# Patient Record
Sex: FEMALE | Race: WHITE | Employment: OTHER | URBAN - METROPOLITAN AREA
[De-identification: names, ages, dates, MRNs, and addresses within clinical notes are randomized per-mention and may not be internally consistent; named-entity substitution may affect disease eponyms.]

---

## 2018-06-28 PROCEDURE — 88312 SPECIAL STAINS GROUP 1: CPT | Performed by: INTERNAL MEDICINE

## 2018-06-28 PROCEDURE — 88305 TISSUE EXAM BY PATHOLOGIST: CPT | Performed by: INTERNAL MEDICINE

## 2018-06-29 ENCOUNTER — HOSPITAL ENCOUNTER (OUTPATIENT)
Dept: LAB | Age: 58
Discharge: HOME | End: 2018-06-29

## 2019-07-11 PROBLEM — E66.01 SEVERE OBESITY (HCC): Status: ACTIVE | Noted: 2019-07-11

## 2019-10-15 ENCOUNTER — HOSPITAL ENCOUNTER (OUTPATIENT)
Age: 59
Setting detail: OBSERVATION
Discharge: HOME | End: 2019-10-18
Attending: UROLOGY | Admitting: UROLOGY
Payer: MEDICARE

## 2019-10-15 PROBLEM — N39.0 UTI (URINARY TRACT INFECTION): Status: ACTIVE | Noted: 2019-10-15

## 2019-10-15 LAB
ANION GAP SERPL CALC-SCNC: 12 MMOL/L (ref 7–16)
BUN SERPL-MCNC: 17 MG/DL (ref 8–23)
CALCIUM SERPL-MCNC: 8.4 MG/DL (ref 8.3–10.4)
CHLORIDE SERPL-SCNC: 108 MMOL/L (ref 98–107)
CO2 SERPL-SCNC: 21 MMOL/L (ref 21–32)
CREAT SERPL-MCNC: 1.37 MG/DL (ref 0.6–1)
ERYTHROCYTE [DISTWIDTH] IN BLOOD BY AUTOMATED COUNT: 13.3 % (ref 11.9–14.6)
GLUCOSE SERPL-MCNC: 156 MG/DL (ref 65–100)
HCT VFR BLD AUTO: 41.4 % (ref 35.8–46.3)
HGB BLD-MCNC: 13.5 G/DL (ref 11.7–15.4)
MCH RBC QN AUTO: 30.4 PG (ref 26.1–32.9)
MCHC RBC AUTO-ENTMCNC: 32.6 G/DL (ref 31.4–35)
MCV RBC AUTO: 93.2 FL (ref 79.6–97.8)
NRBC # BLD: 0 K/UL (ref 0–0.2)
PLATELET # BLD AUTO: 205 K/UL (ref 150–450)
PMV BLD AUTO: 10.3 FL (ref 9.4–12.3)
POTASSIUM SERPL-SCNC: 4.1 MMOL/L (ref 3.5–5.1)
RBC # BLD AUTO: 4.44 M/UL (ref 4.05–5.2)
SODIUM SERPL-SCNC: 141 MMOL/L (ref 136–145)
WBC # BLD AUTO: 9.5 K/UL (ref 4.3–11.1)

## 2019-10-15 PROCEDURE — 36415 COLL VENOUS BLD VENIPUNCTURE: CPT

## 2019-10-15 PROCEDURE — 85027 COMPLETE CBC AUTOMATED: CPT

## 2019-10-15 PROCEDURE — 74011250636 HC RX REV CODE- 250/636: Performed by: UROLOGY

## 2019-10-15 PROCEDURE — 77030005546 HC CATH URETH FOL 3W BARD -A

## 2019-10-15 PROCEDURE — 87086 URINE CULTURE/COLONY COUNT: CPT

## 2019-10-15 PROCEDURE — 99218 HC RM OBSERVATION: CPT

## 2019-10-15 PROCEDURE — 74011250637 HC RX REV CODE- 250/637: Performed by: UROLOGY

## 2019-10-15 PROCEDURE — 80048 BASIC METABOLIC PNL TOTAL CA: CPT

## 2019-10-15 PROCEDURE — 74011000272 HC RX REV CODE- 272: Performed by: UROLOGY

## 2019-10-15 RX ORDER — TRAZODONE HYDROCHLORIDE 50 MG/1
100 TABLET ORAL
Status: DISCONTINUED | OUTPATIENT
Start: 2019-10-15 | End: 2019-10-18 | Stop reason: HOSPADM

## 2019-10-15 RX ORDER — PANTOPRAZOLE SODIUM 40 MG/1
40 TABLET, DELAYED RELEASE ORAL DAILY
COMMUNITY

## 2019-10-15 RX ORDER — BACLOFEN 10 MG/1
10 TABLET ORAL 3 TIMES DAILY
Status: DISCONTINUED | OUTPATIENT
Start: 2019-10-15 | End: 2019-10-18 | Stop reason: HOSPADM

## 2019-10-15 RX ORDER — GLIPIZIDE 2.5 MG/1
5 TABLET, EXTENDED RELEASE ORAL DAILY
Status: DISCONTINUED | OUTPATIENT
Start: 2019-10-16 | End: 2019-10-18 | Stop reason: HOSPADM

## 2019-10-15 RX ORDER — BACLOFEN 10 MG/1
10 TABLET ORAL
COMMUNITY

## 2019-10-15 RX ORDER — AMLODIPINE BESYLATE 5 MG/1
5 TABLET ORAL DAILY
Status: ON HOLD | COMMUNITY
End: 2019-10-15 | Stop reason: CLARIF

## 2019-10-15 RX ORDER — HYDROCODONE BITARTRATE AND ACETAMINOPHEN 10; 325 MG/1; MG/1
1 TABLET ORAL
COMMUNITY

## 2019-10-15 RX ORDER — ASPIRIN 81 MG/1
81 TABLET ORAL DAILY
Status: DISCONTINUED | OUTPATIENT
Start: 2019-10-16 | End: 2019-10-18 | Stop reason: HOSPADM

## 2019-10-15 RX ORDER — PANTOPRAZOLE SODIUM 40 MG/1
40 TABLET, DELAYED RELEASE ORAL
Status: DISCONTINUED | OUTPATIENT
Start: 2019-10-16 | End: 2019-10-18 | Stop reason: HOSPADM

## 2019-10-15 RX ORDER — ASPIRIN 81 MG/1
TABLET ORAL DAILY
COMMUNITY

## 2019-10-15 RX ORDER — AMLODIPINE BESYLATE 10 MG/1
10 TABLET ORAL DAILY
COMMUNITY

## 2019-10-15 RX ORDER — AMLODIPINE BESYLATE 10 MG/1
10 TABLET ORAL DAILY
Status: DISCONTINUED | OUTPATIENT
Start: 2019-10-16 | End: 2019-10-18 | Stop reason: HOSPADM

## 2019-10-15 RX ORDER — LITHIUM CARBONATE 300 MG/1
300 CAPSULE ORAL DAILY
Status: DISCONTINUED | OUTPATIENT
Start: 2019-10-16 | End: 2019-10-18 | Stop reason: HOSPADM

## 2019-10-15 RX ORDER — HYDROCODONE BITARTRATE AND ACETAMINOPHEN 10; 325 MG/1; MG/1
1 TABLET ORAL
Status: DISCONTINUED | OUTPATIENT
Start: 2019-10-15 | End: 2019-10-18 | Stop reason: HOSPADM

## 2019-10-15 RX ORDER — TRIAMTERENE/HYDROCHLOROTHIAZID 37.5-25 MG
TABLET ORAL DAILY
COMMUNITY

## 2019-10-15 RX ORDER — CLONAZEPAM 1 MG/1
1 TABLET ORAL
Status: DISCONTINUED | OUTPATIENT
Start: 2019-10-15 | End: 2019-10-18 | Stop reason: HOSPADM

## 2019-10-15 RX ORDER — TRAZODONE HYDROCHLORIDE 100 MG/1
100 TABLET ORAL
COMMUNITY

## 2019-10-15 RX ORDER — ROSUVASTATIN CALCIUM 40 MG/1
40 TABLET, COATED ORAL
COMMUNITY

## 2019-10-15 RX ORDER — GLIPIZIDE 5 MG/1
TABLET, FILM COATED, EXTENDED RELEASE ORAL DAILY
COMMUNITY

## 2019-10-15 RX ORDER — CLOPIDOGREL BISULFATE 75 MG/1
75 TABLET ORAL DAILY
Status: DISCONTINUED | OUTPATIENT
Start: 2019-10-16 | End: 2019-10-18 | Stop reason: HOSPADM

## 2019-10-15 RX ORDER — METOPROLOL SUCCINATE 100 MG/1
100 TABLET, EXTENDED RELEASE ORAL DAILY
COMMUNITY

## 2019-10-15 RX ORDER — METOPROLOL SUCCINATE 100 MG/1
100 TABLET, EXTENDED RELEASE ORAL DAILY
Status: DISCONTINUED | OUTPATIENT
Start: 2019-10-16 | End: 2019-10-18 | Stop reason: HOSPADM

## 2019-10-15 RX ORDER — CLONAZEPAM 1 MG/1
1 TABLET ORAL
COMMUNITY

## 2019-10-15 RX ORDER — LITHIUM CARBONATE 300 MG
300 TABLET ORAL DAILY
COMMUNITY

## 2019-10-15 RX ORDER — CLOPIDOGREL BISULFATE 75 MG/1
75 TABLET ORAL DAILY
COMMUNITY

## 2019-10-15 RX ORDER — TRIAMTERENE/HYDROCHLOROTHIAZID 37.5-25 MG
1 TABLET ORAL DAILY
Status: DISCONTINUED | OUTPATIENT
Start: 2019-10-16 | End: 2019-10-18 | Stop reason: HOSPADM

## 2019-10-15 RX ORDER — ROSUVASTATIN CALCIUM 20 MG/1
40 TABLET, COATED ORAL
Status: DISCONTINUED | OUTPATIENT
Start: 2019-10-15 | End: 2019-10-18 | Stop reason: HOSPADM

## 2019-10-15 RX ADMIN — HYDROCODONE BITARTRATE AND ACETAMINOPHEN 1 TABLET: 10; 325 TABLET ORAL at 21:11

## 2019-10-15 RX ADMIN — GENTAMICIN SULFATE 160 MG: 40 INJECTION, SOLUTION INTRAMUSCULAR; INTRAVENOUS at 16:33

## 2019-10-15 RX ADMIN — TRAZODONE HYDROCHLORIDE 100 MG: 50 TABLET ORAL at 21:10

## 2019-10-15 RX ADMIN — BACLOFEN 10 MG: 10 TABLET ORAL at 21:11

## 2019-10-15 RX ADMIN — HYDROCODONE BITARTRATE AND ACETAMINOPHEN 1 TABLET: 10; 325 TABLET ORAL at 15:56

## 2019-10-15 RX ADMIN — ROSUVASTATIN CALCIUM 40 MG: 20 TABLET, COATED ORAL at 21:11

## 2019-10-15 NOTE — H&P
Admit per Dr. Hayes Castillo for IV abx, 3-way donis insertion, CBI with gentamicin. Send urine for culture once donis inserted prior to starting abx. Regular diet for now. Check labs. [facility]  [address]  [place], [address]  [phone]     Greer Landau  : 1960          Chief Complaint   Patient presents with    Follow-up       bladder mass   Cystoscopy with Dr. Albina Darby did not show bladder mass but she is had recurrent UTIs.     CT scan in January showed some small bilateral renal cysts and scarring in the left kidney. HPI      Greer Landau is a 62 y.o. female      Mass    Recurrent UTI    Urinary Incontinence      Recurrent UTIs.  CT scan in January this year showed possible bladder mass cystoscopy in February with Dr. Albina Darby did not show mass.  Patient urine today appears to be infected.  Culture in February was mixed urogenital josefian.     Recurrent UTI treated with probiotic cranberry tablets and Macrodantin suppression     Patient's urine today still appears to be infected the last culture I had my was E. coli sensitive to everything. She is done the probiotic and the antibiotics and the cranberry tablets.     All her cultures for the last couple years of been E. coli.             Past Medical History:   Diagnosis Date    Diabetes ([address]      Heart abnormality      Hypertension      Kidney stone        No past surgical history on file. Current Outpatient Medications   Medication Sig Dispense Refill    ciprofloxacin HCl (CIPRO) 500 mg tablet Take 1 Tab by mouth two (2) times a day for 10 days. 20 Tab 2    Lactobacillus acidophilus (BACID) cap Take 2 Caps by mouth two (2) times a day. 120 Cap 12    cranberry fruit 400 mg tab Take 1 Cap by mouth four (4) times daily.  120 Tab 12           Allergies   Allergen Reactions    Sulfa (Sulfonamide Antibiotics) Hives and Nausea and Vomiting      Social History            Socioeconomic History  Marital status: SINGLE       Spouse name: Not on file    Number of children: Not on file    Years of education: Not on file    Highest education level: Not on file   Occupational History    Not on file   Social Needs    Financial resource strain: Not on file    Food insecurity:       Worry: Not on file       Inability: Not on file    Transportation needs:       Medical: Not on file       Non-medical: Not on file   Tobacco Use    Smoking status: Former Smoker    Smokeless tobacco: Never Used   Substance and Sexual Activity    Alcohol use: No       Frequency: Never    Drug use: Not on file    Sexual activity: Not on file   Lifestyle    Physical activity:       Days per week: Not on file       Minutes per session: Not on file    Stress: Not on file   Relationships    Social connections:       Talks on phone: Not on file       Gets together: Not on file       Attends [employer] service: Not on file       Active member of club or organization: Not on file       Attends meetings of clubs or organizations: Not on file       Relationship status: Not on file    Intimate partner violence:       Fear of current or ex partner: Not on file       Emotionally abused: Not on file       Physically abused: Not on file       Forced sexual activity: Not on file   Other Topics Concern    Not on file   Social History Narrative    Not on file            Family History   Problem Relation Age of Onset    Hypertension Mother      High Cholesterol Mother      Kidney Disease Sister           Review of Systems  Constitutional:   Negative for fever and headaches. ENT:  Negative for high frequency hearing loss. Respiratory:  Negative for shortness of breath. Cardiovascular:  Negative for chest pain. GI:  Negative for abdominal pain. Genitourinary:  Negative for urinary burning and hematuria. Musculoskeletal:  Negative for back pain. Neurological:  Negative for numbness.   Psychological:  Negative for depression. Endocrine:  Negative for fatigue. Hem/Lymphatic:  Negative for easy bruising.        Urinalysis  UA - Dipstick         Results for orders placed or performed in visit on 09/19/19   AMB POC URINALYSIS DIP STICK AUTO W/ MICRO (PGU)     Status: None   Result Value Ref Range Status     Glucose (UA POC) Negative Negative mg/dL Final     Bilirubin (UA POC) Negative Negative Final     Ketones (UA POC) Negative Negative Final     Specific gravity (UA POC) 1.010 1.001 - 1.035 Final     Blood (UA POC) Moderate Negative Final     pH (UA POC) 7.0 4.6 - 8.0 Final     Protein (UA POC) 30  Negative Final     Urobilinogen (POC) 0.2 mg/dL   Final     Nitrites (UA POC) Positive Negative Final     Leukocyte esterase (UA POC) Moderate Negative Final   Results for orders placed or performed in visit on 02/08/19   AMB POC URINALYSIS DIP STICK AUTO W/O MICRO (PGU)     Status: None   Result Value Ref Range Status     Glucose (UA POC) Negative Negative mg/dL Final     Bilirubin (UA POC) Negative Negative Final     Ketones (UA POC) Negative Negative Final     Specific gravity (UA POC) 1.015 1.001 - 1.035 Final     Blood (UA POC) Small Negative Final     pH (UA POC) 7.5 4.6 - 8.0 Final     Protein (UA POC) Negative Negative Final     Urobilinogen (POC) 0.2 mg/dL   Final     Nitrites (UA POC) Negative Negative Final     Leukocyte esterase (UA POC) Small Negative Final         UA - Micro  WBC -2 numerous to count  RBC - 0  Bacteria - 0 2+  Epith - 0 a few     Physical Exam        Assessment and Plan      ICD-10-CM ICD-9-CM     1. Chronic cystitis N30.20 595.2 URINE CULTURE,COMPREHENSIVE   2. Bladder mass N32.89 596.89 AMB POC URINALYSIS DIP STICK AUTO W/ MICRO (PGU)   Patient does not have a bladder mass patient has a chronic recurrent E. coli UTI we will plan to admit her for IV antibiotics bladder irrigation ultrasound infectious disease consultation and cystoscopy some week in the near future we will put her on Cipro for now. Cultures pending          Orders Placed This Encounter    URINE CULTURE,COMPREHENSIVE    AMB POC URINALYSIS DIP STICK AUTO W/ MICRO (PGU)    ciprofloxacin HCl (CIPRO) 500 mg tablet       Sig: Take 1 Tab by mouth two (2) times a day for 10 days.       Dispense:  20 Tab       Refill:  2         Follow-up and Dispositions  ·   Return for [facility] will call her for follow-up.           More than 50% of this 15 minute visit was spent counseling the patient about long-term plan and hospital follow-up     Elements of this note have been dictated using speech recognition software. Although reviewed, errors of speech recognition may have occurred. Note Details   Instructions         Return for [facility] will call her for follow-up.    Additional Documentation     Vitals:    /83    Pulse 79    Temp 98.2 °F (36.8 °C) (Oral)    Ht 5' 7\" (1.702 m)    BMI 36.02 kg/m²    BSA 2.22 m²    Encounter Info:    Billing Info,    History,    Allergies,    Detailed Report       Communications         BSI AMB VISIT PROGRESS NOTE sent to Devyn Jacobs MD   BestPractice Advisories     Click to view BestPractice Advisory history   Encounter Messages     No messages in this encounter   Orders Placed         URINE CULTURE,COMPREHENSIVE       AMB POC URINALYSIS DIP STICK AUTO W/ MICRO (PGU)      All Encounter Results    Medication Changes         ciprofloxacin HCl 500 mg Oral 2 TIMES DAILY              Medication List    Visit Diagnoses         Chronic cystitis      Bladder mass      Problem List

## 2019-10-15 NOTE — PROGRESS NOTES
Patient received to room 827 as a direct admit for urology  Admission assessment complete  Alert and oriented x4  Respirations even and unlabored on room air  No signs of distress  Dual skin assessment performed with Jeffrey Ledbetter RN  No skin breakdown noted  Call light and belongings within reach  Safety measures in place  Will monitor

## 2019-10-16 ENCOUNTER — APPOINTMENT (OUTPATIENT)
Dept: CT IMAGING | Age: 59
End: 2019-10-16
Attending: NURSE PRACTITIONER
Payer: MEDICARE

## 2019-10-16 PROCEDURE — 74011000272 HC RX REV CODE- 272: Performed by: UROLOGY

## 2019-10-16 PROCEDURE — 96365 THER/PROPH/DIAG IV INF INIT: CPT

## 2019-10-16 PROCEDURE — 96367 TX/PROPH/DG ADDL SEQ IV INF: CPT

## 2019-10-16 PROCEDURE — 96366 THER/PROPH/DIAG IV INF ADDON: CPT

## 2019-10-16 PROCEDURE — 74176 CT ABD & PELVIS W/O CONTRAST: CPT

## 2019-10-16 PROCEDURE — 74011250637 HC RX REV CODE- 250/637: Performed by: UROLOGY

## 2019-10-16 PROCEDURE — 74011000258 HC RX REV CODE- 258: Performed by: UROLOGY

## 2019-10-16 PROCEDURE — 99218 HC RM OBSERVATION: CPT

## 2019-10-16 PROCEDURE — 74011250636 HC RX REV CODE- 250/636: Performed by: UROLOGY

## 2019-10-16 RX ORDER — CIPROFLOXACIN 2 MG/ML
400 INJECTION, SOLUTION INTRAVENOUS EVERY 12 HOURS
Status: DISCONTINUED | OUTPATIENT
Start: 2019-10-16 | End: 2019-10-18 | Stop reason: HOSPADM

## 2019-10-16 RX ORDER — GENTAMICIN SULFATE 40 MG/ML
80 INJECTION, SOLUTION INTRAMUSCULAR; INTRAVENOUS EVERY 12 HOURS
Status: DISCONTINUED | OUTPATIENT
Start: 2019-10-16 | End: 2019-10-16

## 2019-10-16 RX ADMIN — CIPROFLOXACIN 400 MG: 2 INJECTION, SOLUTION INTRAVENOUS at 09:53

## 2019-10-16 RX ADMIN — GLIPIZIDE 5 MG: 2.5 TABLET, FILM COATED, EXTENDED RELEASE ORAL at 09:53

## 2019-10-16 RX ADMIN — HYDROCODONE BITARTRATE AND ACETAMINOPHEN 1 TABLET: 10; 325 TABLET ORAL at 14:16

## 2019-10-16 RX ADMIN — HYDROCODONE BITARTRATE AND ACETAMINOPHEN 1 TABLET: 10; 325 TABLET ORAL at 22:55

## 2019-10-16 RX ADMIN — TRAZODONE HYDROCHLORIDE 100 MG: 50 TABLET ORAL at 20:24

## 2019-10-16 RX ADMIN — CLONAZEPAM 1 MG: 1 TABLET ORAL at 20:25

## 2019-10-16 RX ADMIN — CLOPIDOGREL BISULFATE 75 MG: 75 TABLET ORAL at 09:53

## 2019-10-16 RX ADMIN — CIPROFLOXACIN 400 MG: 2 INJECTION, SOLUTION INTRAVENOUS at 20:23

## 2019-10-16 RX ADMIN — BACLOFEN 10 MG: 10 TABLET ORAL at 09:52

## 2019-10-16 RX ADMIN — AMLODIPINE BESYLATE 10 MG: 10 TABLET ORAL at 09:53

## 2019-10-16 RX ADMIN — BACLOFEN 10 MG: 10 TABLET ORAL at 20:25

## 2019-10-16 RX ADMIN — BACLOFEN 10 MG: 10 TABLET ORAL at 17:42

## 2019-10-16 RX ADMIN — HYDROCODONE BITARTRATE AND ACETAMINOPHEN 1 TABLET: 10; 325 TABLET ORAL at 09:53

## 2019-10-16 RX ADMIN — HYDROCODONE BITARTRATE AND ACETAMINOPHEN 1 TABLET: 10; 325 TABLET ORAL at 18:17

## 2019-10-16 RX ADMIN — ROSUVASTATIN CALCIUM 40 MG: 20 TABLET, COATED ORAL at 20:25

## 2019-10-16 RX ADMIN — GENTAMICIN SULFATE 160 MG: 40 INJECTION, SOLUTION INTRAMUSCULAR; INTRAVENOUS at 17:42

## 2019-10-16 RX ADMIN — ASPIRIN 81 MG: 81 TABLET ORAL at 09:52

## 2019-10-16 RX ADMIN — METOPROLOL SUCCINATE 100 MG: 100 TABLET, EXTENDED RELEASE ORAL at 20:24

## 2019-10-16 RX ADMIN — LITHIUM CARBONATE 300 MG: 300 CAPSULE, GELATIN COATED ORAL at 09:53

## 2019-10-16 RX ADMIN — TRIAMTERENE AND HYDROCHLOROTHIAZIDE 1 TABLET: 37.5; 25 TABLET ORAL at 09:53

## 2019-10-16 RX ADMIN — GENTAMICIN SULFATE 520 MG: 40 INJECTION, SOLUTION INTRAMUSCULAR; INTRAVENOUS at 12:42

## 2019-10-16 RX ADMIN — PANTOPRAZOLE SODIUM 40 MG: 40 TABLET, DELAYED RELEASE ORAL at 05:20

## 2019-10-16 RX ADMIN — HYDROCODONE BITARTRATE AND ACETAMINOPHEN 1 TABLET: 10; 325 TABLET ORAL at 05:19

## 2019-10-16 NOTE — PROGRESS NOTES
Care Management Interventions  PCP Verified by CM: Yes  Physical Therapy Consult: No  Occupational Therapy Consult: No  Current Support Network: Other  Confirm Follow Up Transport: Other (see comment)  Plan discussed with Pt/Family/Caregiver: Yes  Freedom of Choice Offered: Yes  Discharge Location  Discharge Placement: Home  Patient is alert and oriented in all spheres. She and her granddaughter live together. Patient uses a cane and rollator to ambulate. Patient's granddaughter assists with ADLs. Patient drives to her medical appointments when she's able but at times she's not able to. She has used [employer] in the past for transportation but has lost the number and hasn't used this service for some time. CM gave patient the number for the Medicaid van to set up transportation for medical appointments. Patient moved to [facility] from [place] in 2015. She had aides through [facility] insurance to assist her with ADLs. She requested assistance in getting [facility] services started. CM gave her the number for this services. Patient may benefit from [facility] services at discharge. Patient has home 02 through [employer] but does not use it because she feels as though she's suffocating. Patient has been prescribed a cpap in the past but was noncompliant. CM following.

## 2019-10-16 NOTE — PROGRESS NOTES
Admit Date: 10/15/2019    Subjective:     Darlin Melissa is eating breakfast.  No complaints. Objective:     Patient Vitals for the past 8 hrs:   BP Temp Pulse Resp SpO2 Weight   10/16/19 1056 109/50 97.6 °F (36.4 °C) 70 16 92 %    10/16/19 1014      226 lb (102.5 kg)   10/16/19 0732 107/65 98.1 °F (36.7 °C) 66 20 92 %      10/16 0701 - 10/16 1900  In: 240 [P.O.:240]  Out: -   10/14 1901 - 10/16 0700  In: 732 [P.O.:480]  Out: 1900 [Urine:1900]    Physical Exam:  GENERAL: alert, cooperative, no distress  LUNG: clear to auscultation bilaterally  HEART: regular rate and rhythm, S1, S2 ABDOMEN: soft, non-tender  NEUROLOGIC: AOx3    Data Review   Recent Results (from the past 24 hour(s))   METABOLIC PANEL, BASIC    Collection Time: 10/15/19  1:57 PM   Result Value Ref Range    Sodium 141 136 - 145 mmol/L    Potassium 4.1 3.5 - 5.1 mmol/L    Chloride 108 (H) 98 - 107 mmol/L    CO2 21 21 - 32 mmol/L    Anion gap 12 7 - 16 mmol/L    Glucose 156 (H) 65 - 100 mg/dL    BUN 17 8 - 23 MG/DL    Creatinine 1.37 (H) 0.6 - 1.0 MG/DL    GFR est AA 50 (L) >60 ml/min/1.73m2    GFR est non-AA 42 (L) >60 ml/min/1.73m2    Calcium 8.4 8.3 - 10.4 MG/DL   CBC W/O DIFF    Collection Time: 10/15/19  3:11 PM   Result Value Ref Range    WBC 9.5 4.3 - 11.1 K/uL    RBC 4.44 4.05 - 5.2 M/uL    HGB 13.5 11.7 - 15.4 g/dL    HCT 41.4 35.8 - 46.3 %    MCV 93.2 79.6 - 97.8 FL    MCH 30.4 26.1 - 32.9 PG    MCHC 32.6 31.4 - 35.0 g/dL    RDW 13.3 11.9 - 14.6 %    PLATELET 653 415 - 122 K/uL    MPV 10.3 9.4 - 12.3 FL    ABSOLUTE NRBC 0.00 0.0 - 0.2 K/uL   CULTURE, URINE    Collection Time: 10/15/19  4:52 PM   Result Value Ref Range    Special Requests: NO SPECIAL REQUESTS      Culture result:        NO GROWTH AFTER SHORT PERIOD OF INCUBATION. FURTHER RESULTS TO FOLLOW AFTER OVERNIGHT INCUBATION. Assessment:     Active Problems:    UTI (urinary tract infection) (10/15/2019)      Chronic recurrent E. Coli UTI.     Cn 1.37  WBC 9.5    Plan:     Continue 3-way donis with CBI (infused with gentamicin). Continue IV abx. Obtain CT urogram to identify any abnormalities given LYNNE and chronic recurrent chronic UTIs. Plan for cystoscopy Friday. Patient is seen and examined in collaboration with Dr. Giovanna Dominguez. Assessment and plan as per Dr. Mariaa Nelson.       Jasper Silva, NP  [facility]

## 2019-10-16 NOTE — PROGRESS NOTES
Beside shift report received from [facility]  Patient lying in bed  Respirations present  No signs of distress  No needs expressed at this time  Safety measures in place

## 2019-10-16 NOTE — PROGRESS NOTES
Pt bedside report received from [facility], pt resting in bed  watching TV, assessment completed ,  pt AxOx4 bed on low and locked position with floor free of objects,  call light within reach, respirations even and non labored, pt verbalized understanding to call for needs,  no c/o pain, no distress noted.

## 2019-10-16 NOTE — PROGRESS NOTES
End of shift report given to [facility] , pt is stable  resting in bed, no c/o pain , call light within reach, bed locked and low position, pt respirations even and unlabored, no distress noted.

## 2019-10-16 NOTE — PROGRESS NOTES
Pt received schedule medications and prn pain med call light within reach , bed on low position and locked, pt able to make needs known,, no discomfort noted.

## 2019-10-16 NOTE — PROGRESS NOTES
Pharmacokinetic Consult to Pharmacist    Anabell Herron is a 62 y.o. female being treated for UTI with gentamicin IV and bladder irritation. Weight: 102.5 kg (226 lb)  Lab Results   Component Value Date/Time    BUN 17 10/15/2019 01:57 PM    Creatinine 1.37 (H) 10/15/2019 01:57 PM    WBC 9.5 10/15/2019 03:11 PM      Estimated Creatinine Clearance: 53.1 mL/min (A) (based on SCr of 1.37 mg/dL (H)). Day 1 of therapy. Will initiate 520 mg q36 hours  Plan to monitor renal function and get a level following the second dose. Will continue to follow patient.       Thank you,  Delia Rodríguez, PharmD  Clinical Pharmacy PGY1 Resident   [phone]

## 2019-10-17 ENCOUNTER — ANESTHESIA EVENT (OUTPATIENT)
Dept: SURGERY | Age: 59
End: 2019-10-17
Payer: MEDICARE

## 2019-10-17 LAB — CREAT SERPL-MCNC: 1.35 MG/DL (ref 0.6–1)

## 2019-10-17 PROCEDURE — 82565 ASSAY OF CREATININE: CPT

## 2019-10-17 PROCEDURE — 74011250637 HC RX REV CODE- 250/637: Performed by: UROLOGY

## 2019-10-17 PROCEDURE — 74011000272 HC RX REV CODE- 272: Performed by: UROLOGY

## 2019-10-17 PROCEDURE — 36415 COLL VENOUS BLD VENIPUNCTURE: CPT

## 2019-10-17 PROCEDURE — 96366 THER/PROPH/DIAG IV INF ADDON: CPT

## 2019-10-17 PROCEDURE — 99218 HC RM OBSERVATION: CPT

## 2019-10-17 PROCEDURE — 74011250636 HC RX REV CODE- 250/636: Performed by: UROLOGY

## 2019-10-17 RX ADMIN — AMLODIPINE BESYLATE 10 MG: 10 TABLET ORAL at 09:41

## 2019-10-17 RX ADMIN — BACLOFEN 10 MG: 10 TABLET ORAL at 16:51

## 2019-10-17 RX ADMIN — CIPROFLOXACIN 400 MG: 2 INJECTION, SOLUTION INTRAVENOUS at 21:50

## 2019-10-17 RX ADMIN — GENTAMICIN SULFATE 160 MG: 40 INJECTION, SOLUTION INTRAMUSCULAR; INTRAVENOUS at 19:14

## 2019-10-17 RX ADMIN — HYDROCODONE BITARTRATE AND ACETAMINOPHEN 1 TABLET: 10; 325 TABLET ORAL at 05:54

## 2019-10-17 RX ADMIN — ASPIRIN 81 MG: 81 TABLET ORAL at 09:41

## 2019-10-17 RX ADMIN — BACLOFEN 10 MG: 10 TABLET ORAL at 21:51

## 2019-10-17 RX ADMIN — CLONAZEPAM 1 MG: 1 TABLET ORAL at 16:51

## 2019-10-17 RX ADMIN — METOPROLOL SUCCINATE 100 MG: 100 TABLET, EXTENDED RELEASE ORAL at 21:51

## 2019-10-17 RX ADMIN — HYDROCODONE BITARTRATE AND ACETAMINOPHEN 1 TABLET: 10; 325 TABLET ORAL at 13:39

## 2019-10-17 RX ADMIN — HYDROCODONE BITARTRATE AND ACETAMINOPHEN 1 TABLET: 10; 325 TABLET ORAL at 09:42

## 2019-10-17 RX ADMIN — CIPROFLOXACIN 400 MG: 2 INJECTION, SOLUTION INTRAVENOUS at 09:43

## 2019-10-17 RX ADMIN — BACLOFEN 10 MG: 10 TABLET ORAL at 09:42

## 2019-10-17 RX ADMIN — HYDROCODONE BITARTRATE AND ACETAMINOPHEN 1 TABLET: 10; 325 TABLET ORAL at 22:01

## 2019-10-17 RX ADMIN — GENTAMICIN SULFATE 160 MG: 40 INJECTION, SOLUTION INTRAMUSCULAR; INTRAVENOUS at 19:10

## 2019-10-17 RX ADMIN — HYDROCODONE BITARTRATE AND ACETAMINOPHEN 1 TABLET: 10; 325 TABLET ORAL at 17:31

## 2019-10-17 RX ADMIN — TRAZODONE HYDROCHLORIDE 100 MG: 50 TABLET ORAL at 21:51

## 2019-10-17 RX ADMIN — ROSUVASTATIN CALCIUM 40 MG: 20 TABLET, COATED ORAL at 21:51

## 2019-10-17 RX ADMIN — TRIAMTERENE AND HYDROCHLOROTHIAZIDE 1 TABLET: 37.5; 25 TABLET ORAL at 09:42

## 2019-10-17 RX ADMIN — CLOPIDOGREL BISULFATE 75 MG: 75 TABLET ORAL at 09:42

## 2019-10-17 RX ADMIN — LITHIUM CARBONATE 300 MG: 300 CAPSULE, GELATIN COATED ORAL at 09:42

## 2019-10-17 RX ADMIN — PANTOPRAZOLE SODIUM 40 MG: 40 TABLET, DELAYED RELEASE ORAL at 05:53

## 2019-10-17 RX ADMIN — GLIPIZIDE 5 MG: 2.5 TABLET, FILM COATED, EXTENDED RELEASE ORAL at 09:42

## 2019-10-17 NOTE — PROGRESS NOTES
Pt bedside report received from [facility], pt resting in bed  watching Tv, assessment completed ,  pt AxOx4 bed on low and locked position with floor free of objects,  call light within reach,, respirations even and non labored, pt verbalized understanding to call for needs,  no c/o pain, no distress noted.

## 2019-10-17 NOTE — PROGRESS NOTES
Admit Date: 10/15/2019    Subjective:     Arya Alvares has no complaints. CBI continues. Turcios in place, urine is clear yellow. Urine culture so far is 10,000 colonies mixed skin josefina    Objective:     Patient Vitals for the past 8 hrs:   BP Temp Pulse Resp SpO2 Weight   10/17/19 0716 107/41 98.2 °F (36.8 °C) (!) 57 17 91 %    10/17/19 0318 100/63 98.3 °F (36.8 °C) 90 19 94 % 236 lb 1.6 oz (107.1 kg)     10/17 0701 - 10/17 1900  In: 600   Out: 2343 [JVGGI:0776]  10/15 1901 - 10/17 0700  In: 2617 [P.O.:480]  Out: 8769 [Urine:3750]    Physical Exam:  GENERAL: alert, cooperative, no distress  LUNG: clear to auscultation bilaterally  HEART: regular rate and rhythm, S1, S2  ABDOMEN: soft, non-tender  NEUROLOGIC: AOx3    Data Review   Recent Results (from the past 24 hour(s))   CREATININE    Collection Time: 10/17/19  6:53 AM   Result Value Ref Range    Creatinine 1.35 (H) 0.6 - 1.0 MG/DL       Assessment:     Active Problems:    UTI (urinary tract infection) (10/15/2019)      Chronic recurrent E. Coli UTI.         Cn 1.35    Plan:     CT reviewed. Continue CBI with gentamicin. Continue IV abx. Plan for cystoscopy tomorrow. Patient is seen and examined in collaboration with Dr. Sabine Adam. Assessment and plan as per Dr. Alejandra Bryan. Mahad Pak NP  [facility]  Patient for cystoscopy tomorrow. Patient's culture was mixed skin josefina history of resistant E. coli. Irrigation with antibiotics. Patient also had a diagnosis of interstitial cystitis has a degenerative disease of the spine and does have a InterStim that was placed but is not activated. We will do a cystoscopy tomorrow.   CT scan did not show any  pathology    Kenny LUGO

## 2019-10-17 NOTE — PROGRESS NOTES
Beside shift report received from [facility]  Patient lying in bed  Respirations present  No signs of distress  No needs expressed at this time  Safety measures in place

## 2019-10-17 NOTE — PROGRESS NOTES
Problem: Falls - Risk of  Goal: *Absence of Falls  Description  Document Vineet Cunningham Fall Risk and appropriate interventions in the flowsheet.   Outcome: Progressing Towards Goal  Note:   Fall Risk Interventions:  Mobility Interventions: Patient to call before getting OOB         Medication Interventions: Patient to call before getting OOB    Elimination Interventions: Call light in reach

## 2019-10-17 NOTE — PROGRESS NOTES
End of shift report given to [facility] , pt is stable resting in bed, no c/o pain , call light within reach, bed locked and low position, pt respirations even and unlabored, no distress noted.

## 2019-10-17 NOTE — PROGRESS NOTES
Pt received schedule medications and prn klonopincall light within reach , bed on low position and locked, pt able to make needs known , no discomfort noted.

## 2019-10-17 NOTE — PROGRESS NOTES
Pharmacokinetic Consult to Pharmacist    Paul Alcazar is a 62 y.o. female being treated for UTI with gentamicin IV and bladder irritation. Weight: 107.1 kg (236 lb 1.6 oz)  Lab Results   Component Value Date/Time    BUN 17 10/15/2019 01:57 PM    Creatinine 1.35 (H) 10/17/2019 06:53 AM    WBC 9.5 10/15/2019 03:11 PM      Estimated Creatinine Clearance: 55.1 mL/min (A) (based on SCr of 1.35 mg/dL (H)). Day 2 of gentamicin. Dose adjusted today to 400 mg IV q36h, which is 5 mg/kg AdjBW. Plan to check random level and utilize [facility] nomogram for dose adjustments. Pharmacy will continue to follow. Please call with any questions.     Thank you,  Trixie Argueta, PharmD  Clinical Pharmacist  [phone]

## 2019-10-17 NOTE — PROGRESS NOTES
checked in on Pt. Offered active listening and a sense of connection and care. Pt grateful for  stopping by. Please consult Spiritual Care as needed. Audrey Tavarez.

## 2019-10-18 ENCOUNTER — ANESTHESIA (OUTPATIENT)
Dept: SURGERY | Age: 59
End: 2019-10-18
Payer: MEDICARE

## 2019-10-18 VITALS
SYSTOLIC BLOOD PRESSURE: 130 MMHG | BODY MASS INDEX: 36.64 KG/M2 | RESPIRATION RATE: 16 BRPM | HEART RATE: 60 BPM | TEMPERATURE: 97.7 F | WEIGHT: 233.91 LBS | DIASTOLIC BLOOD PRESSURE: 78 MMHG | OXYGEN SATURATION: 95 %

## 2019-10-18 LAB
BACTERIA SPEC CULT: NORMAL
GLUCOSE BLD STRIP.AUTO-MCNC: 185 MG/DL (ref 65–100)
SERVICE CMNT-IMP: NORMAL

## 2019-10-18 PROCEDURE — 99218 HC RM OBSERVATION: CPT

## 2019-10-18 PROCEDURE — 76210000063 HC OR PH I REC FIRST 0.5 HR: Performed by: UROLOGY

## 2019-10-18 PROCEDURE — 76060000032 HC ANESTHESIA 0.5 TO 1 HR: Performed by: UROLOGY

## 2019-10-18 PROCEDURE — 82962 GLUCOSE BLOOD TEST: CPT

## 2019-10-18 PROCEDURE — 74011250636 HC RX REV CODE- 250/636

## 2019-10-18 PROCEDURE — 77030019927 HC TBNG IRR CYSTO BAXT -A: Performed by: UROLOGY

## 2019-10-18 PROCEDURE — 77030032490 HC SLV COMPR SCD KNE COVD -B: Performed by: UROLOGY

## 2019-10-18 PROCEDURE — 74011250637 HC RX REV CODE- 250/637: Performed by: UROLOGY

## 2019-10-18 PROCEDURE — 76010000138 HC OR TIME 0.5 TO 1 HR: Performed by: UROLOGY

## 2019-10-18 PROCEDURE — 77030018832 HC SOL IRR H20 ICUM -A: Performed by: UROLOGY

## 2019-10-18 RX ORDER — MIDAZOLAM HYDROCHLORIDE 1 MG/ML
INJECTION, SOLUTION INTRAMUSCULAR; INTRAVENOUS AS NEEDED
Status: DISCONTINUED | OUTPATIENT
Start: 2019-10-18 | End: 2019-10-18 | Stop reason: HOSPADM

## 2019-10-18 RX ORDER — LIDOCAINE AND PRILOCAINE 25; 25 MG/G; MG/G
CREAM TOPICAL ONCE
Status: DISCONTINUED | OUTPATIENT
Start: 2019-10-18 | End: 2019-10-18 | Stop reason: HOSPADM

## 2019-10-18 RX ORDER — ALBUTEROL SULFATE 0.83 MG/ML
2.5 SOLUTION RESPIRATORY (INHALATION) AS NEEDED
Status: DISCONTINUED | OUTPATIENT
Start: 2019-10-18 | End: 2019-10-18 | Stop reason: HOSPADM

## 2019-10-18 RX ORDER — NITROFURANTOIN MACROCRYSTALS 50 MG/1
50 CAPSULE ORAL
Qty: 90 CAP | Refills: 4 | Status: SHIPPED | OUTPATIENT
Start: 2019-10-18 | End: 2020-01-02 | Stop reason: DRUGHIGH

## 2019-10-18 RX ORDER — HYDROMORPHONE HYDROCHLORIDE 2 MG/ML
0.5 INJECTION, SOLUTION INTRAMUSCULAR; INTRAVENOUS; SUBCUTANEOUS
Status: DISCONTINUED | OUTPATIENT
Start: 2019-10-18 | End: 2019-10-18 | Stop reason: HOSPADM

## 2019-10-18 RX ORDER — PROPOFOL 10 MG/ML
INJECTION, EMULSION INTRAVENOUS AS NEEDED
Status: DISCONTINUED | OUTPATIENT
Start: 2019-10-18 | End: 2019-10-18 | Stop reason: HOSPADM

## 2019-10-18 RX ORDER — OXYCODONE HYDROCHLORIDE 5 MG/1
10 TABLET ORAL
Status: DISCONTINUED | OUTPATIENT
Start: 2019-10-18 | End: 2019-10-18 | Stop reason: HOSPADM

## 2019-10-18 RX ORDER — PROPOFOL 10 MG/ML
INJECTION, EMULSION INTRAVENOUS
Status: DISCONTINUED | OUTPATIENT
Start: 2019-10-18 | End: 2019-10-18 | Stop reason: HOSPADM

## 2019-10-18 RX ORDER — SODIUM CHLORIDE, SODIUM LACTATE, POTASSIUM CHLORIDE, CALCIUM CHLORIDE 600; 310; 30; 20 MG/100ML; MG/100ML; MG/100ML; MG/100ML
INJECTION, SOLUTION INTRAVENOUS
Status: DISCONTINUED | OUTPATIENT
Start: 2019-10-18 | End: 2019-10-18 | Stop reason: HOSPADM

## 2019-10-18 RX ORDER — LIDOCAINE HYDROCHLORIDE 10 MG/ML
0.1 INJECTION INFILTRATION; PERINEURAL AS NEEDED
Status: DISCONTINUED | OUTPATIENT
Start: 2019-10-18 | End: 2019-10-18 | Stop reason: HOSPADM

## 2019-10-18 RX ORDER — SODIUM CHLORIDE, SODIUM LACTATE, POTASSIUM CHLORIDE, CALCIUM CHLORIDE 600; 310; 30; 20 MG/100ML; MG/100ML; MG/100ML; MG/100ML
100 INJECTION, SOLUTION INTRAVENOUS CONTINUOUS
Status: DISCONTINUED | OUTPATIENT
Start: 2019-10-18 | End: 2019-10-18 | Stop reason: HOSPADM

## 2019-10-18 RX ORDER — ONDANSETRON 2 MG/ML
4 INJECTION INTRAMUSCULAR; INTRAVENOUS ONCE
Status: DISCONTINUED | OUTPATIENT
Start: 2019-10-18 | End: 2019-10-18 | Stop reason: HOSPADM

## 2019-10-18 RX ORDER — OXYCODONE HYDROCHLORIDE 5 MG/1
5 TABLET ORAL
Status: DISCONTINUED | OUTPATIENT
Start: 2019-10-18 | End: 2019-10-18 | Stop reason: HOSPADM

## 2019-10-18 RX ORDER — OXYBUTYNIN CHLORIDE 10 MG/1
10 TABLET, EXTENDED RELEASE ORAL
Qty: 90 TAB | Refills: 4 | Status: SHIPPED | OUTPATIENT
Start: 2019-10-18 | End: 2020-01-02 | Stop reason: SDUPTHER

## 2019-10-18 RX ORDER — DIPHENHYDRAMINE HYDROCHLORIDE 50 MG/ML
12.5 INJECTION, SOLUTION INTRAMUSCULAR; INTRAVENOUS
Status: DISCONTINUED | OUTPATIENT
Start: 2019-10-18 | End: 2019-10-18 | Stop reason: HOSPADM

## 2019-10-18 RX ORDER — NALOXONE HYDROCHLORIDE 0.4 MG/ML
0.1 INJECTION, SOLUTION INTRAMUSCULAR; INTRAVENOUS; SUBCUTANEOUS AS NEEDED
Status: DISCONTINUED | OUTPATIENT
Start: 2019-10-18 | End: 2019-10-18 | Stop reason: HOSPADM

## 2019-10-18 RX ADMIN — TRIAMTERENE AND HYDROCHLOROTHIAZIDE 1 TABLET: 37.5; 25 TABLET ORAL at 10:48

## 2019-10-18 RX ADMIN — PROPOFOL 60 MG: 10 INJECTION, EMULSION INTRAVENOUS at 08:15

## 2019-10-18 RX ADMIN — BACLOFEN 10 MG: 10 TABLET ORAL at 10:48

## 2019-10-18 RX ADMIN — GLIPIZIDE 5 MG: 2.5 TABLET, FILM COATED, EXTENDED RELEASE ORAL at 10:48

## 2019-10-18 RX ADMIN — HYDROCODONE BITARTRATE AND ACETAMINOPHEN 1 TABLET: 10; 325 TABLET ORAL at 10:48

## 2019-10-18 RX ADMIN — SODIUM CHLORIDE, SODIUM LACTATE, POTASSIUM CHLORIDE, CALCIUM CHLORIDE: 600; 310; 30; 20 INJECTION, SOLUTION INTRAVENOUS at 08:05

## 2019-10-18 RX ADMIN — CLONAZEPAM 1 MG: 1 TABLET ORAL at 10:48

## 2019-10-18 RX ADMIN — PROPOFOL 200 MCG/KG/MIN: 10 INJECTION, EMULSION INTRAVENOUS at 08:15

## 2019-10-18 RX ADMIN — MIDAZOLAM HYDROCHLORIDE 2 MG: 1 INJECTION, SOLUTION INTRAMUSCULAR; INTRAVENOUS at 08:14

## 2019-10-18 RX ADMIN — AMLODIPINE BESYLATE 10 MG: 10 TABLET ORAL at 10:48

## 2019-10-18 RX ADMIN — LITHIUM CARBONATE 300 MG: 300 CAPSULE, GELATIN COATED ORAL at 10:48

## 2019-10-18 RX ADMIN — PANTOPRAZOLE SODIUM 40 MG: 40 TABLET, DELAYED RELEASE ORAL at 05:48

## 2019-10-18 NOTE — ANESTHESIA PREPROCEDURE EVALUATION
Relevant Problems   No relevant active problems       Anesthetic History               Review of Systems / Medical History  Patient summary reviewed, nursing notes reviewed and pertinent labs reviewed    Pulmonary    COPD: moderate               Neuro/Psych              Cardiovascular    Hypertension          CAD and cardiac stents    Exercise tolerance: >4 METS  Comments: MARCELINO on DAPT   GI/Hepatic/Renal                Endo/Other    Diabetes: well controlled, type 2         Other Findings              Physical Exam    Airway  Mallampati: II  TM Distance: 4 - 6 cm  Neck ROM: normal range of motion   Mouth opening: Normal     Cardiovascular  Regular rate and rhythm,  S1 and S2 normal,  no murmur, click, rub, or gallop             Dental    Dentition: Edentulous     Pulmonary  Breath sounds clear to auscultation               Abdominal         Other Findings            Anesthetic Plan    ASA: 3  Anesthesia type: total IV anesthesia and general - backup          Induction: Intravenous  Anesthetic plan and risks discussed with: Patient

## 2019-10-18 NOTE — ADDENDUM NOTE
Addendum  created 10/18/19 0955 by Angelo Escalona CRNA    Flowsheet data copied forward, Intraprocedure Flowsheets edited

## 2019-10-18 NOTE — BRIEF OP NOTE
BRIEF OPERATIVE NOTE    Date of Procedure: 10/18/2019   Preoperative Diagnosis: Chronic cystitis [N30.20], OAB,  Urinary urgency, hematuria  Postoperative Diagnosis: Chronic cystitis [N30.20]  OAB,  Urinary urgency, hematuria    Procedure(s):  CYSTOSCOPY   Surgeon(s) and Role:     An Stone MD - Primary         Surgical Assistant:     Surgical Staff:  Circ-1: Mita Valenzuela RN  Event Time In Time Out   Incision Start 10/18/2019 0831    Incision Close 10/18/2019 0849      Anesthesia: MAC   Estimated Blood Loss:   Specimens: * No specimens in log *   Findings:    Complications:   Implants: * No implants in log *

## 2019-10-18 NOTE — PROGRESS NOTES
For cystoscopy today    CT of  tract OK. Treated for  UTI and HX of OAB/IC    Physical Exam   Constitutional: She is oriented to person, place, and time. obease   Cardiovascular: Normal rate. Pulmonary/Chest: Effort normal.   Abdominal:   protuberant   Neurological: She is alert and oriented to person, place, and time. Skin: Skin is warm and dry.    Psychiatric: Affect normal.

## 2019-10-18 NOTE — PERIOP NOTES
TRANSFER - OUT REPORT:    Verbal report given to Sarmad Richter on Lorrane Deniz  being transferred back to room 831 for inpatient care. Report consisted of patients Situation, Background, Assessment and   Recommendations(SBAR). Information from the following report(s) SBAR, Kardex, OR Summary, Procedure Summary, Intake/Output, MAR, Recent Results and Cardiac Rhythm NSR was reviewed with the receiving nurse. Lines:   Peripheral IV 10/16/19 Left Forearm (Active)   Site Assessment Clean, dry, & intact 10/18/2019  2:50 AM   Phlebitis Assessment 0 10/18/2019  2:50 AM   Infiltration Assessment 0 10/18/2019  2:50 AM   Dressing Status Clean, dry, & intact 10/18/2019  2:50 AM   Dressing Type Transparent 10/18/2019  2:50 AM   Hub Color/Line Status Flushed 10/18/2019  2:50 AM       Peripheral IV 10/18/19 Right Wrist (Active)   Site Assessment Clean, dry, & intact 10/18/2019  8:49 AM   Phlebitis Assessment 0 10/18/2019  8:49 AM   Infiltration Assessment 0 10/18/2019  8:49 AM   Dressing Status Clean, dry, & intact 10/18/2019  8:49 AM   Dressing Type Transparent 10/18/2019  8:49 AM   Hub Color/Line Status Patent; Infusing 10/18/2019  8:49 AM        Opportunity for questions and clarification was provided. Patient transported with:   Chart, IV, Pump, jewelry, and clothing    VTE prophylaxis orders have been written for Lorrane Deniz. Patient and family given floor number and nurses name. Family updated re: pt status after security code verified.

## 2019-10-18 NOTE — PROGRESS NOTES
Patient received to room 831 from recovery  Alert and oriented  Asking for morning meds and pain meds

## 2019-10-18 NOTE — OP NOTES
[facility]  OPERATIVE REPORT    Name:  Xiang Mehta  MR#:  [id number]  :  1960  ACCOUNT #:  [de-identified]  DATE OF SERVICE:  10/18/2019    PREOPERATIVE DIAGNOSES:  Chronic cystitis, recurrent urinary tract infections, overactive bladder, urinary urgency and hematuria. POSTOPERATIVE DIAGNOSES:  Chronic cystitis, recurrent urinary tract infections, overactive bladder, urinary urgency and hematuria. PROCEDURE PERFORMED:  Cystoscopy. SURGEON:  Zhanna Bryan MD    ASSISTANT:  None. ANESTHESIA:  MAC.    COMPLICATIONS:  None. SPECIMENS REMOVED:  None. IMPLANTS:  None. ESTIMATED BLOOD LOSS:  None. FINDINGS:  Per dictation. OPERATIVE INDICATIONS:  The patient is a 55-year-old female who has chronic bladder issues, recurrent UTIs, hematuria admitted for evaluation and antibiotic irrigation of bladder for chronic resistant UTI. After undergoing bladder irrigation with gentamicin and IV antibiotics this week, she was brought down for cystoscopy. CT scan earlier this week did not show any  pathology. In light of the hematuria she needed a cystoscopy and has elected to do this under MAC anesthesia. PROCEDURE:  The patient was taken to the operating room suite, underwent MAC anesthesia. She was placed in the dorsal lithotomy position, prepped with Betadine and draped in appropriate manner. Urethra appeared normal.  She did have a moderate cystocele. The 17-Surinamese scope easily passed into the bladder and was observed using the 30 degrees lens. The patient had no bladder lesions noted except for some irritation where the tip of the Turcios catheter had been resting on the posterior bladder wall. The patient had both ureteral orifices identified. They were in normal position and appeared to have clear efflux of urine. There were no mucosal lesions.   No extrinsic compression and there was a mild-to-moderate cystocele with the floor of the bladder being distended somewhat. Urethra was normal with no polyps or irritation. IMPRESSION:  Chronic cystitis, neurogenic bladder, overactive bladder, history of a InterStim placed. It has been deactivated since it did not seem to help much with her bladder issues. RECOMMENDATIONS:  Are to discharge her home today. She will probably go home on a stronger anticholinergic and some antibiotic suppression.         Shelly Loza MD      [id number]  D:  10/18/2019 8:50  T:  10/18/2019 11:11  JOB #:  [id number]

## 2019-10-18 NOTE — PROGRESS NOTES
Pt received schedule medications and prn pain med  call light within reach , bed on low position and locked, pt able to make needs known, no discomfort noted.

## 2019-10-18 NOTE — ANESTHESIA POSTPROCEDURE EVALUATION
Procedure(s):  CYSTOSCOPY .    total IV anesthesia, general - backup    Anesthesia Post Evaluation      Multimodal analgesia: multimodal analgesia used between 6 hours prior to anesthesia start to PACU discharge  Patient location during evaluation: PACU  Patient participation: complete - patient participated  Level of consciousness: awake and awake and alert  Pain management: adequate  Airway patency: patent  Anesthetic complications: no  Cardiovascular status: acceptable  Respiratory status: acceptable  Hydration status: acceptable  Post anesthesia nausea and vomiting:  controlled      Vitals Value Taken Time   /67 10/18/2019  9:12 AM   Temp 36.8 °C (98.2 °F) 10/18/2019  9:02 AM   Pulse 63 10/18/2019  9:14 AM   Resp 12 10/18/2019  9:12 AM   SpO2 94 % 10/18/2019  9:14 AM   Vitals shown include unvalidated device data.

## 2019-10-18 NOTE — PROGRESS NOTES
Tolerated lunch well  Voiding without difficulty  Discharged to home  Out of hospital via w/c  Accompanied by [employer]

## 2019-10-18 NOTE — DISCHARGE INSTRUCTIONS
DISCHARGE SUMMARY from Nurse    PATIENT INSTRUCTIONS:    After general anesthesia or intravenous sedation, for 24 hours or while taking prescription Narcotics:  · Limit your activities  · Do not drive and operate hazardous machinery  · Do not make important personal or business decisions  · Do  not drink alcoholic beverages  · If you have not urinated within 8 hours after discharge, please contact your surgeon on call. Report the following to your surgeon:  · Excessive pain, swelling, redness or odor of or around the surgical area  · Temperature over 100.5  · Nausea and vomiting lasting longer than 4 hours or if unable to take medications  · Any signs of decreased circulation or nerve impairment to extremity: change in color, persistent  numbness, tingling, coldness or increase pain  · Any questions    What to do at Home:  Recommended activity: Activity as tolerated. If you experience any of the following symptoms temp > 101.5, unrelieved pain, nausea or vomiting, unable to urinate or decreased urination, please follow up with MD.    *  Please give a list of your current medications to your Primary Care Provider. *  Please update this list whenever your medications are discontinued, doses are      changed, or new medications (including over-the-counter products) are added. *  Please carry medication information at all times in case of emergency situations. These are general instructions for a healthy lifestyle:    No smoking/ No tobacco products/ Avoid exposure to second hand smoke  Surgeon General's Warning:  Quitting smoking now greatly reduces serious risk to your health.     Obesity, smoking, and sedentary lifestyle greatly increases your risk for illness    A healthy diet, regular physical exercise & weight monitoring are important for maintaining a healthy lifestyle    You may be retaining fluid if you have a history of heart failure or if you experience any of the following symptoms:  Weight gain of 3 pounds or more overnight or 5 pounds in a week, increased swelling in our hands or feet or shortness of breath while lying flat in bed. Please call your doctor as soon as you notice any of these symptoms; do not wait until your next office visit. The discharge information has been reviewed with the patient. The patient verbalized understanding. Discharge medications reviewed with the patient and appropriate educational materials and side effects teaching were provided. Patient Education        Urinary Tract Infection in Women: Care Instructions  Your Care Instructions    A urinary tract infection, or UTI, is a general term for an infection anywhere between the kidneys and the urethra (where urine comes out). Most UTIs are bladder infections. They often cause pain or burning when you urinate. UTIs are caused by bacteria and can be cured with antibiotics. Be sure to complete your treatment so that the infection goes away. Follow-up care is a key part of your treatment and safety. Be sure to make and go to all appointments, and call your doctor if you are having problems. It's also a good idea to know your test results and keep a list of the medicines you take. How can you care for yourself at home? · Take your antibiotics as directed. Do not stop taking them just because you feel better. You need to take the full course of antibiotics. · Drink extra water and other fluids for the next day or two. This may help wash out the bacteria that are causing the infection. (If you have kidney, heart, or liver disease and have to limit fluids, talk with your doctor before you increase your fluid intake.)  · Avoid drinks that are carbonated or have caffeine. They can irritate the bladder. · Urinate often. Try to empty your bladder each time. · To relieve pain, take a hot bath or lay a heating pad set on low over your lower belly or genital area. Never go to sleep with a heating pad in place.   To prevent UTIs  · Drink plenty of water each day. This helps you urinate often, which clears bacteria from your system. (If you have kidney, heart, or liver disease and have to limit fluids, talk with your doctor before you increase your fluid intake.)  · Urinate when you need to. · Urinate right after you have sex. · Change sanitary pads often. · Avoid douches, bubble baths, feminine hygiene sprays, and other feminine hygiene products that have deodorants. · After going to the bathroom, wipe from front to back. When should you call for help? Call your doctor now or seek immediate medical care if:    · Symptoms such as fever, chills, nausea, or vomiting get worse or appear for the first time.     · You have new pain in your back just below your rib cage. This is called flank pain.     · There is new blood or pus in your urine.     · You have any problems with your antibiotic medicine.    Watch closely for changes in your health, and be sure to contact your doctor if:    · You are not getting better after taking an antibiotic for 2 days.     · Your symptoms go away but then come back. Where can you learn more? Go to http://chay-solitario.info/. Enter K066 in the search box to learn more about \"Urinary Tract Infection in Women: Care Instructions. \"  Current as of: December 19, 2018  Content Version: 12.2  © 7590-1099 [employer]. Care instructions adapted under license by [employer] (which disclaims liability or warranty for this information). If you have questions about a medical condition or this instruction, always ask your healthcare professional. [place] [address] any warranty or liability for your use of this information. Patient Education        Cystoscopy: What to Expect at [facility]    A cystoscopy is a procedure that lets a doctor look inside of the bladder and the urethra.  The urethra is the tube that carries urine from the bladder to outside the body. The doctor uses a thin, lighted tool called a cystoscope. Your bladder is filled with fluid. This stretches the bladder so that your doctor can look closely at the inside of your bladder. After the cystoscopy, your urethra may be sore at first, and it may burn when you urinate for the first few days after the procedure. You may feel the need to urinate more often, and your urine may be pink. These symptoms should get better in 1 or 2 days. You will probably be able to go back to most of your usual activities in 1 or 2 days. This care sheet gives you a general idea about how long it will take for you to recover. But each person recovers at a different pace. Follow the steps below to get better as quickly as possible. How can you care for yourself at home? Activity    · Rest when you feel tired. Getting enough sleep will help you recover.     · Try to walk each day. Start by walking a little more than you did the day before. Bit by bit, increase the amount you walk. Walking boosts blood flow and helps prevent pneumonia and constipation.     · Avoid strenuous activities, such as bicycle riding, jogging, weight lifting, or aerobic exercise, until your doctor says it is okay.     · Ask your doctor when you can drive again.     · Most people are able to return to work within 1 or 2 days after the procedure.     · You may shower and take baths as usual.     · Ask your doctor when it is okay for you to have sex. Diet    · You can eat your normal diet. If your stomach is upset, try bland, low-fat foods like plain rice, broiled chicken, toast, and yogurt.     · Drink plenty of fluids (unless your doctor tells you not to). Medicines    · Take pain medicines exactly as directed. ? If the doctor gave you a prescription medicine for pain, take it as prescribed.   ? If you are not taking a prescription pain medicine, ask your doctor if you can take an over-the-counter medicine.     · If you think your pain medicine is making you sick to your stomach:  ? Take your medicine after meals (unless your doctor has told you not to). ? Ask your doctor for a different pain medicine.     · If your doctor prescribed antibiotics, take them as directed. Do not stop taking them just because you feel better. You need to take the full course of antibiotics. Follow-up care is a key part of your treatment and safety. Be sure to make and go to all appointments, and call your doctor if you are having problems. It's also a good idea to know your test results and keep a list of the medicines you take. When should you call for help? Call 911 anytime you think you may need emergency care. For example, call if:    · You passed out (lost consciousness).     · You have severe trouble breathing.     · You have sudden chest pain and shortness of breath, or you cough up blood.     · You have severe belly pain.    Call your doctor now or seek immediate medical care if:    · You are sick to your stomach or cannot keep fluids down.     · Your urine is still red or you see blood clots after you have urinated several times.     · You have trouble passing urine or stool, especially if you have pain or swelling in your lower belly.     · You have signs of a blood clot, such as:  ? Pain in your calf, back of the knee, thigh, or groin. ? Redness and swelling in your leg or groin.     · You develop a fever or severe chills.     · You have pain in your back just below your rib cage. This is called flank pain.    Watch closely for changes in your health, and be sure to contact your doctor if:    · You have pain or burning when you urinate. A burning feeling is normal for a day or two after the test, but call if it does not get better.     · You have a frequent urge to urinate but can pass only small amounts of urine.     · Your urine is pink, red, or cloudy, or smells bad.  It is normal for the urine to have a pinkish color for a few days after the test, but call if it does not get better. Where can you learn more? Go to http://chay-solitario.info/. Enter M961 in the search box to learn more about \"Cystoscopy: What to Expect at Home. \"  Current as of: December 19, 2018  Content Version: 12.2  © 4705-6782 [employer]. Care instructions adapted under license by [employer] (which disclaims liability or warranty for this information). If you have questions about a medical condition or this instruction, always ask your healthcare professional. [address] any warranty or liability for your use of this information.          ___________________________________________________________________________________________________________________________________

## 2019-10-18 NOTE — PROGRESS NOTES
Discharge instructions, follow up information, medication list, and prescription information provided and explained to the pt. IV removed by primary RN, no remote telemetry on. Opportunity for questions provided. Patient states ride is on the way. Instructed to call once ready to leave.

## 2019-10-18 NOTE — PROGRESS NOTES
Pt bedside report received from [facility], pt resting in bed  watching Tv, assessment completed ,  pt AxOx4 bed on low and locked position with floor free of objects,  call light within reach,respirations even and non labored, pt verbalized understanding to call for needs,  no c/o pain, no distress noted.

## 2019-10-19 NOTE — DISCHARGE SUMMARY
[facility]  DISCHARGE SUMMARY    Name:  Cathy Oliva  MR#:  [id number]  :  1960  ACCOUNT #:  [de-identified]  ADMIT DATE:  10/15/2019  DISCHARGE DATE:  10/18/2019      HISTORY OF PRESENT ILLNESS:  Recurrent UTIs in this obese diabetic female with history of bladder control issues, history of InterStim placement that did not successfully help with bladder control. The patient had microscopic hematuria but is on Plavix. CT scan earlier this year showed some renal cysts, scarring in the left kidney. The patient was admitted for evaluation of a possible bladder mass and to have bladder irrigation with antibiotic irrigation of gentamicin and was given IV antibiotic of Rocephin and gentamicin while in the hospital.  Previous culture had been E. coli sensitive to everything. Culture this time on admission was mixed skin josefina. The patient had a repeat CT scan that did not show any obvious lesions in the urinary tract. The patient is a diabetic with neuropathy. She has atherosclerotic cardiovascular disease, hypertension, and history of kidney stone in the past.  She does have some chronic back pain. HOSPITAL COURSE:  She was admitted and placed on IV antibiotics with Rocephin and gentamicin and her bladder was irrigated with continuous bladder irrigation with gentamicin. As noted, CT scan did not show any  pathology. On 10/18/2019, she underwent a cystoscopy. Cystoscopy did not show any bladder lesion. She did have a mild cystocele. 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 No evidence for interstitial cystitis.     DISCHARGE PLAN:  Recommend that she go home on antibiotic suppression with Macrodantin 50 mg 1 a day at bedtime along with Ditropan XL 10 mg at bedtime and have her follow up with the nurse practitioner in about 8 weeks. The patient will resume diet and activity.       Clarita Aden MD      [id number]  D:  10/18/2019 8:56  T:  10/19/2019 7:36  JOB #:  [id number]

## 2021-06-29 ENCOUNTER — HOSPITAL ENCOUNTER (OUTPATIENT)
Dept: LAB | Age: 61
Discharge: HOME | End: 2021-06-29

## 2021-06-29 PROCEDURE — 88305 TISSUE EXAM BY PATHOLOGIST: CPT

## 2021-06-29 PROCEDURE — 88312 SPECIAL STAINS GROUP 1: CPT

## 2022-03-19 PROBLEM — N39.0 UTI (URINARY TRACT INFECTION): Status: ACTIVE | Noted: 2019-10-15

## 2022-03-20 PROBLEM — E66.01 SEVERE OBESITY (HCC): Status: ACTIVE | Noted: 2019-07-11

## 2023-11-29 ENCOUNTER — TELEPHONE (OUTPATIENT)
Dept: OBGYN CLINIC | Age: 63
End: 2023-11-29

## 2023-11-29 NOTE — TELEPHONE ENCOUNTER
Pt has no showed or cancelled 3 appointments with our office (10/16/23, 10/23/23, and 11/29/23). Per provider, please do not reschedule her with our office. Noted in appointment desk as well.

## 2024-01-10 ENCOUNTER — TELEPHONE (OUTPATIENT)
Dept: OBGYN CLINIC | Age: 64
End: 2024-01-10

## 2024-01-10 NOTE — TELEPHONE ENCOUNTER
PC to patient to inform we have to cancel appt. Scheduled for 1/17/24.  Per Dr. Potter patient has either cancelled or no showed to 4 different scheduled new patient appointments.      I called patient and lm for return call.  Letter being mailed to patient today ale rushing

## 2024-05-08 ENCOUNTER — OFFICE VISIT (OUTPATIENT)
Dept: UROLOGY | Age: 64
End: 2024-05-08
Payer: COMMERCIAL

## 2024-05-08 DIAGNOSIS — R31.9 URINARY TRACT INFECTION WITH HEMATURIA, SITE UNSPECIFIED: ICD-10-CM

## 2024-05-08 DIAGNOSIS — R32 URINARY INCONTINENCE, UNSPECIFIED TYPE: Primary | ICD-10-CM

## 2024-05-08 DIAGNOSIS — R32 URINARY INCONTINENCE, UNSPECIFIED TYPE: ICD-10-CM

## 2024-05-08 DIAGNOSIS — N39.0 URINARY TRACT INFECTION WITH HEMATURIA, SITE UNSPECIFIED: ICD-10-CM

## 2024-05-08 LAB
BILIRUBIN, URINE, POC: NEGATIVE
BLOOD URINE, POC: NORMAL
GLUCOSE URINE, POC: NEGATIVE
KETONES, URINE, POC: NEGATIVE
LEUKOCYTE ESTERASE, URINE, POC: NORMAL
NITRITE, URINE, POC: POSITIVE
PH, URINE, POC: 6 (ref 4.6–8)
PROTEIN,URINE, POC: 30
SPECIFIC GRAVITY, URINE, POC: 1.01 (ref 1–1.03)
URINALYSIS CLARITY, POC: NORMAL
URINALYSIS COLOR, POC: NORMAL
UROBILINOGEN, POC: NORMAL

## 2024-05-08 PROCEDURE — 0509F URINE INCON PLAN DOCD: CPT | Performed by: NURSE PRACTITIONER

## 2024-05-08 PROCEDURE — 1090F PRES/ABSN URINE INCON ASSESS: CPT | Performed by: NURSE PRACTITIONER

## 2024-05-08 PROCEDURE — G8428 CUR MEDS NOT DOCUMENT: HCPCS | Performed by: NURSE PRACTITIONER

## 2024-05-08 PROCEDURE — 3017F COLORECTAL CA SCREEN DOC REV: CPT | Performed by: NURSE PRACTITIONER

## 2024-05-08 PROCEDURE — 81003 URINALYSIS AUTO W/O SCOPE: CPT | Performed by: NURSE PRACTITIONER

## 2024-05-08 PROCEDURE — 1123F ACP DISCUSS/DSCN MKR DOCD: CPT | Performed by: NURSE PRACTITIONER

## 2024-05-08 PROCEDURE — 99204 OFFICE O/P NEW MOD 45 MIN: CPT | Performed by: NURSE PRACTITIONER

## 2024-05-08 PROCEDURE — G8421 BMI NOT CALCULATED: HCPCS | Performed by: NURSE PRACTITIONER

## 2024-05-08 PROCEDURE — G8400 PT W/DXA NO RESULTS DOC: HCPCS | Performed by: NURSE PRACTITIONER

## 2024-05-08 PROCEDURE — 4004F PT TOBACCO SCREEN RCVD TLK: CPT | Performed by: NURSE PRACTITIONER

## 2024-05-08 RX ORDER — NITROFURANTOIN 25; 75 MG/1; MG/1
CAPSULE ORAL
Qty: 90 CAPSULE | Refills: 3 | Status: SHIPPED | OUTPATIENT
Start: 2024-05-08

## 2024-05-08 RX ORDER — KETOROLAC TROMETHAMINE 10 MG/1
10 TABLET, FILM COATED ORAL EVERY 6 HOURS PRN
COMMUNITY

## 2024-05-08 RX ORDER — DIAZEPAM 5 MG/1
5 TABLET ORAL 3 TIMES DAILY
COMMUNITY
Start: 2024-04-14

## 2024-05-08 RX ORDER — MECLIZINE HYDROCHLORIDE 25 MG/1
25 TABLET ORAL 3 TIMES DAILY PRN
COMMUNITY

## 2024-05-08 RX ORDER — MORPHINE SULFATE 15 MG/1
TABLET ORAL
COMMUNITY
Start: 2024-04-30

## 2024-05-08 ASSESSMENT — ENCOUNTER SYMPTOMS
SKIN LESIONS: 0
INDIGESTION: 0
NAUSEA: 0
ABDOMINAL PAIN: 0
BLOOD IN STOOL: 0
COUGH: 0
BACK PAIN: 1
EYE DISCHARGE: 0
SHORTNESS OF BREATH: 0
HEARTBURN: 0
VOMITING: 0
WHEEZING: 1
CONSTIPATION: 0
DIARRHEA: 0
EYE PAIN: 0

## 2024-05-08 NOTE — PROGRESS NOTES
[facility] UROLOGY  [address] [place] [address] [id number] [phone]          Adriana Kulkarni  : 1960    Chief Complaint   Patient presents with    New Patient    Incontinence          HPI     Adriana Kulkarni is a 63 y.o. female  Referred to us by [facility] Medicine secondary to urinary incontinence and urinary infection.  Patient says that incontinence has been normal for several years.  She wears adult hands every day.  Some days she goes through several depends and some days just a couple.  She tells me she has \"vaginal discharge.\"  It appears that she has been tested for yeast vaginosis and GC trichomoniasis.  All of those test came back negative.  She brings in one of her depends to show me the discharge.    As far as UTI is concerned she says the urine has a foul odor.  She is voiding frequently but is not experiencing any pain.  She is not having any fever chills or any gross hematuria.  The urine is dark brown in color at times.    The incontinence occurs every day.  She says most days she is unable to hold the urine.  She has been on oxybutynin 15 mg XL in the past.  She stopped that however secondary to her leg swelling.  She is not unclear as to whether or not that actually helped the incontinence and frequency.    Significant history of 2 vaginal births.  She underwent a hysterectomy secondary to menstrual problems.  To her knowledge there is no significant history of kidney disease or urinary neoplasms.  She tells me her mother and sister both have had recurrent urinary infections.        Past Medical History:   Diagnosis Date    Diabetes (HCC)     Heart abnormality     Hypertension     Kidney stone      No past surgical history on file.  Current Outpatient Medications   Medication Sig Dispense Refill    diazePAM (VALIUM) 5 MG tablet Take 1 tablet by mouth 3 times daily.      morphine (MSIR) 15 MG tablet TAKE 1 TABLET BY MOUTH EVERY 3-4 HOURS AS NEEDED FOR PAIN.

## 2024-05-12 LAB
BACTERIA SPEC CULT: ABNORMAL
SERVICE CMNT-IMP: ABNORMAL

## 2024-05-24 ENCOUNTER — TELEPHONE (OUTPATIENT)
Dept: UROLOGY | Age: 64
End: 2024-05-24

## 2024-07-24 ENCOUNTER — OFFICE VISIT (OUTPATIENT)
Dept: UROLOGY | Age: 64
End: 2024-07-24
Payer: COMMERCIAL

## 2024-07-24 DIAGNOSIS — R32 URINARY INCONTINENCE, UNSPECIFIED TYPE: Primary | ICD-10-CM

## 2024-07-24 DIAGNOSIS — N39.0 URINARY TRACT INFECTION WITH HEMATURIA, SITE UNSPECIFIED: ICD-10-CM

## 2024-07-24 DIAGNOSIS — R31.9 URINARY TRACT INFECTION WITH HEMATURIA, SITE UNSPECIFIED: ICD-10-CM

## 2024-07-24 LAB
BILIRUBIN, URINE, POC: NEGATIVE
BLOOD URINE, POC: NORMAL
GLUCOSE URINE, POC: NEGATIVE
KETONES, URINE, POC: NEGATIVE
LEUKOCYTE ESTERASE, URINE, POC: NORMAL
NITRITE, URINE, POC: NEGATIVE
PH, URINE, POC: 5.5 (ref 4.6–8)
PROTEIN,URINE, POC: NEGATIVE
PVR, POC: 152 CC
SPECIFIC GRAVITY, URINE, POC: 1.01 (ref 1–1.03)
URINALYSIS CLARITY, POC: NORMAL
URINALYSIS COLOR, POC: NORMAL
UROBILINOGEN, POC: NORMAL

## 2024-07-24 PROCEDURE — 1123F ACP DISCUSS/DSCN MKR DOCD: CPT | Performed by: NURSE PRACTITIONER

## 2024-07-24 PROCEDURE — 81003 URINALYSIS AUTO W/O SCOPE: CPT | Performed by: NURSE PRACTITIONER

## 2024-07-24 PROCEDURE — 0509F URINE INCON PLAN DOCD: CPT | Performed by: NURSE PRACTITIONER

## 2024-07-24 PROCEDURE — G8421 BMI NOT CALCULATED: HCPCS | Performed by: NURSE PRACTITIONER

## 2024-07-24 PROCEDURE — 1090F PRES/ABSN URINE INCON ASSESS: CPT | Performed by: NURSE PRACTITIONER

## 2024-07-24 PROCEDURE — G8428 CUR MEDS NOT DOCUMENT: HCPCS | Performed by: NURSE PRACTITIONER

## 2024-07-24 PROCEDURE — 3017F COLORECTAL CA SCREEN DOC REV: CPT | Performed by: NURSE PRACTITIONER

## 2024-07-24 PROCEDURE — 4004F PT TOBACCO SCREEN RCVD TLK: CPT | Performed by: NURSE PRACTITIONER

## 2024-07-24 PROCEDURE — G8400 PT W/DXA NO RESULTS DOC: HCPCS | Performed by: NURSE PRACTITIONER

## 2024-07-24 PROCEDURE — 51798 US URINE CAPACITY MEASURE: CPT | Performed by: NURSE PRACTITIONER

## 2024-07-24 PROCEDURE — 99214 OFFICE O/P EST MOD 30 MIN: CPT | Performed by: NURSE PRACTITIONER

## 2024-07-24 ASSESSMENT — ENCOUNTER SYMPTOMS
BACK PAIN: 0
NAUSEA: 0

## 2024-07-24 NOTE — PROGRESS NOTES
[facility] UROLOGY  [address] [place] [address] [id number] [phone]          Adriana Kulkarni  : 1960    Chief Complaint   Patient presents with    Follow-up          HPI     Adriana Kulkarni is a 64 y.o. female  Referred to us by [facility] Internal Medicine secondary to urinary incontinence and urinary infection.  Patient says that incontinence has been normal for several years.  She wears adult hands every day.  Some days she goes through several depends and some days just a couple.  She tells me she has \"vaginal discharge.\"  It appears that she has been tested for yeast vaginosis and GC trichomoniasis.  All of those test came back negative.  She brings in one of her depends to show me the discharge.     As far as UTI is concerned she says the urine has a foul odor.  She is voiding frequently but is not experiencing any pain.  She is not having any fever chills or any gross hematuria.  The urine is dark brown in color at times.  Urine culture at last visit did grow greater than 100,000 colonies of E. coli.  She was given Macrobid to take if she took it twice a day for a week and then she is still taking it daily.  The urine today does still show large leukocytes.     The incontinence occurs every day.  She says most days she is unable to hold the urine.  She has been on oxybutynin 15 mg XL in the past.  She stopped that however secondary to her leg swelling.  She is not unclear as to whether or not that actually helped the incontinence and frequency.  Last visit when I saw her I gave her Myrbetriq and ask her to take it daily.  She tells me she has seen no significant improvement of her incontinence.  She actually tells me today that 8 or 9 years ago she had an InterStim placed.  She did not share this with me on the first visit.  She has not had this checked in many years and is for sure that it is no longer functioning.     Significant history of 2 vaginal births.  She underwent a

## 2024-07-26 LAB
BACTERIA SPEC CULT: NORMAL
SERVICE CMNT-IMP: NORMAL

## 2024-09-19 ENCOUNTER — OFFICE VISIT (OUTPATIENT)
Age: 64
End: 2024-09-19

## 2024-09-19 VITALS
BODY MASS INDEX: 26.84 KG/M2 | OXYGEN SATURATION: 95 % | HEIGHT: 67 IN | RESPIRATION RATE: 18 BRPM | TEMPERATURE: 97.3 F | HEART RATE: 80 BPM | SYSTOLIC BLOOD PRESSURE: 116 MMHG | DIASTOLIC BLOOD PRESSURE: 68 MMHG | WEIGHT: 171 LBS

## 2024-09-19 DIAGNOSIS — J44.9 CHRONIC OBSTRUCTIVE PULMONARY DISEASE, UNSPECIFIED COPD TYPE (HCC): Primary | ICD-10-CM

## 2024-09-19 DIAGNOSIS — R09.02 HYPOXEMIA: ICD-10-CM

## 2024-09-19 DIAGNOSIS — G47.33 OSA (OBSTRUCTIVE SLEEP APNEA): ICD-10-CM

## 2024-09-19 DIAGNOSIS — R06.09 DOE (DYSPNEA ON EXERTION): ICD-10-CM

## 2024-09-19 DIAGNOSIS — Z51.5 PALLIATIVE CARE PATIENT: ICD-10-CM

## 2024-09-19 RX ORDER — ALBUTEROL SULFATE 90 UG/1
AEROSOL, METERED RESPIRATORY (INHALATION)
COMMUNITY
Start: 2016-09-21

## 2024-09-19 RX ORDER — FLUTICASONE FUROATE, UMECLIDINIUM BROMIDE AND VILANTEROL TRIFENATATE 100; 62.5; 25 UG/1; UG/1; UG/1
1 POWDER RESPIRATORY (INHALATION) DAILY
Qty: 1 EACH | Refills: 11 | Status: SHIPPED | OUTPATIENT
Start: 2024-09-19

## 2024-09-19 RX ORDER — FLUTICASONE PROPIONATE 50 MCG
2 SPRAY, SUSPENSION (ML) NASAL DAILY
COMMUNITY
Start: 2018-01-22

## 2024-09-19 RX ORDER — LEVOTHYROXINE SODIUM 25 UG/1
TABLET ORAL
COMMUNITY
Start: 2024-07-11

## 2024-09-19 RX ORDER — ALBUTEROL SULFATE 0.83 MG/ML
SOLUTION RESPIRATORY (INHALATION)
COMMUNITY
Start: 2016-09-21

## 2024-10-11 ENCOUNTER — TELEPHONE (OUTPATIENT)
Dept: UROLOGY | Age: 64
End: 2024-10-11

## 2024-10-11 ENCOUNTER — OFFICE VISIT (OUTPATIENT)
Dept: UROLOGY | Age: 64
End: 2024-10-11
Payer: MEDICAID

## 2024-10-11 DIAGNOSIS — R31.9 URINARY TRACT INFECTION WITH HEMATURIA, SITE UNSPECIFIED: ICD-10-CM

## 2024-10-11 DIAGNOSIS — R32 URINARY INCONTINENCE, UNSPECIFIED TYPE: Primary | ICD-10-CM

## 2024-10-11 DIAGNOSIS — N39.0 URINARY TRACT INFECTION WITH HEMATURIA, SITE UNSPECIFIED: ICD-10-CM

## 2024-10-11 PROCEDURE — 99214 OFFICE O/P EST MOD 30 MIN: CPT | Performed by: NURSE PRACTITIONER

## 2024-10-11 PROCEDURE — 1123F ACP DISCUSS/DSCN MKR DOCD: CPT | Performed by: NURSE PRACTITIONER

## 2024-10-11 ASSESSMENT — ENCOUNTER SYMPTOMS
BACK PAIN: 0
NAUSEA: 0

## 2024-10-11 NOTE — PROGRESS NOTES
[facility] UROLOGY  [address] [place] [id number]  [phone]          Adriana Kulkarni  : 1960    Chief Complaint   Patient presents with    Other     Discharge          HPI     Adriana Kulkarni is a 64 y.o. female  Back today complaining of \"passing a black oblong cocoon looking object with yellow discharge inside of it.\"  Patient is unable to give me urine today.  She has been treated in the past for vaginal discharge.  She is with her aide today and they wonder if it was actually some sort of blood clot.  Still also complaining of pain in the vaginal area but she also feels the bladder is painful..    Referred to us by [facility] Internal Medicine secondary to urinary incontinence and urinary infection.  Patient says that incontinence has been normal for several years.  She wears adult depends every day.  Some days she goes through several depends and some days just a couple.  She tells me she has \"vaginal discharge.\"  It appears that she has been tested for yeast vaginosis and GC trichomoniasis.  All of those test came back negative.  She brings in one of her depends to show me the discharge.     As far as UTI is concerned she says the urine has a foul odor.  She is voiding frequently but is not experiencing any pain.  She is not having any fever chills or any gross hematuria.  The urine is dark brown in color at times.  Urine culture at last visit did grow greater than 100,000 colonies of E. coli.  She was given Macrobid to take if she took it twice a day for a week and then she is still taking it daily.  The urine today does still show large leukocytes.     The incontinence occurs every day.  She says most days she is unable to hold the urine.  She has been on oxybutynin 15 mg XL in the past.  She stopped that however secondary to her leg swelling.  She is not unclear as to whether or not that actually helped the incontinence and frequency.  Last visit when I saw her I gave

## 2024-10-29 ENCOUNTER — TELEPHONE (OUTPATIENT)
Age: 64
End: 2024-10-29

## 2024-10-29 NOTE — TELEPHONE ENCOUNTER
Left VM for patient to call the office so we can reschedule her 12/26 appointment. Will be sending a letter as well to have her call to r/s

## 2024-11-06 ENCOUNTER — TELEPHONE (OUTPATIENT)
Dept: UROLOGY | Age: 64
End: 2024-11-06

## 2024-11-13 ENCOUNTER — TELEPHONE (OUTPATIENT)
Dept: UROLOGY | Age: 64
End: 2024-11-13

## 2024-11-13 NOTE — TELEPHONE ENCOUNTER
Pt called and sated she had an appt today that she had arrived 1hr early for, stated that her care taker could not be there that long and appt was r/s. Pt states that when she got home she noticed blood in urine and is having bad flank pain on left and right side, asked for earlier appt I checked on yovany and samir's schedules but nothing available earlier than scheduled in [facility]. Pt niels like a call if something becomes available sooner.

## 2024-11-14 ENCOUNTER — NURSE ONLY (OUTPATIENT)
Age: 64
End: 2024-11-14

## 2024-11-14 DIAGNOSIS — R06.09 DOE (DYSPNEA ON EXERTION): Primary | ICD-10-CM

## 2024-11-14 NOTE — PROGRESS NOTES
Patient came in for FTs but she was not feeling well and wanted to reschedule. Stated that she spoke to someone in the office and they told her to come on in. JANE

## 2024-11-20 ENCOUNTER — TELEPHONE (OUTPATIENT)
Dept: UROLOGY | Age: 64
End: 2024-11-20

## 2024-12-05 ENCOUNTER — NURSE ONLY (OUTPATIENT)
Age: 64
End: 2024-12-05
Payer: MEDICAID

## 2024-12-05 DIAGNOSIS — R06.09 DOE (DYSPNEA ON EXERTION): ICD-10-CM

## 2024-12-05 LAB
FEV 1 , POC: 1.33 L
FEV1 % PRED, POC: 53 %
FEV1/FVC, POC: NORMAL
FVC % PRED, POC: 64 %
FVC, POC: NORMAL

## 2024-12-05 PROCEDURE — 94729 DIFFUSING CAPACITY: CPT | Performed by: INTERNAL MEDICINE

## 2024-12-05 PROCEDURE — 94060 EVALUATION OF WHEEZING: CPT | Performed by: INTERNAL MEDICINE

## 2024-12-05 PROCEDURE — 94726 PLETHYSMOGRAPHY LUNG VOLUMES: CPT | Performed by: INTERNAL MEDICINE

## 2024-12-05 ASSESSMENT — PULMONARY FUNCTION TESTS
FVC_PERCENT_PREDICTED_POC: 64
FEV1_PERCENT_PREDICTED_POC: 53

## 2024-12-05 NOTE — RESULT ENCOUNTER NOTE
Please let patient know that breathing tests are consistent with COPD.  Continue inhaler.  Will discuss at appt next month.

## 2024-12-18 ENCOUNTER — TELEPHONE (OUTPATIENT)
Dept: UROLOGY | Age: 64
End: 2024-12-18

## 2024-12-18 ENCOUNTER — PREP FOR PROCEDURE (OUTPATIENT)
Dept: UROLOGY | Age: 64
End: 2024-12-18

## 2024-12-18 ENCOUNTER — PROCEDURE VISIT (OUTPATIENT)
Dept: UROLOGY | Age: 64
End: 2024-12-18
Payer: MEDICAID

## 2024-12-18 DIAGNOSIS — T83.721A EXPOSURE OF IMPLANTED VAGINAL MESH, INITIAL ENCOUNTER: Primary | ICD-10-CM

## 2024-12-18 DIAGNOSIS — R31.9 URINARY TRACT INFECTION WITH HEMATURIA, SITE UNSPECIFIED: ICD-10-CM

## 2024-12-18 DIAGNOSIS — N39.0 URINARY TRACT INFECTION WITH HEMATURIA, SITE UNSPECIFIED: ICD-10-CM

## 2024-12-18 DIAGNOSIS — R32 URINARY INCONTINENCE, UNSPECIFIED TYPE: Primary | ICD-10-CM

## 2024-12-18 DIAGNOSIS — T83.721A EXPOSURE OF IMPLANTED VAGINAL MESH, INITIAL ENCOUNTER (HCC): ICD-10-CM

## 2024-12-18 LAB
BILIRUBIN, URINE, POC: NEGATIVE
BLOOD URINE, POC: NORMAL
GLUCOSE URINE, POC: NEGATIVE MG/DL
KETONES, URINE, POC: NEGATIVE MG/DL
LEUKOCYTE ESTERASE, URINE, POC: NORMAL
NITRITE, URINE, POC: NEGATIVE
PH, URINE, POC: 5.5 (ref 4.6–8)
PROTEIN,URINE, POC: NEGATIVE MG/DL
SPECIFIC GRAVITY, URINE, POC: 1.01 (ref 1–1.03)
URINALYSIS CLARITY, POC: NORMAL
URINALYSIS COLOR, POC: NORMAL
UROBILINOGEN, POC: NORMAL MG/DL

## 2024-12-18 PROCEDURE — 1123F ACP DISCUSS/DSCN MKR DOCD: CPT | Performed by: UROLOGY

## 2024-12-18 PROCEDURE — 81003 URINALYSIS AUTO W/O SCOPE: CPT | Performed by: UROLOGY

## 2024-12-18 PROCEDURE — 1159F MED LIST DOCD IN RCRD: CPT | Performed by: UROLOGY

## 2024-12-18 PROCEDURE — 52000 CYSTOURETHROSCOPY: CPT | Performed by: UROLOGY

## 2024-12-18 PROCEDURE — 99213 OFFICE O/P EST LOW 20 MIN: CPT | Performed by: UROLOGY

## 2024-12-18 NOTE — PROGRESS NOTES
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  Has one cat and one dog.  Single.  Has lived in [place] and [place].     Social Determinants of Health     Financial Resource Strain: Not on file   Food Insecurity: Not on file   Transportation Needs: Not on file   Physical Activity: Not on file (9/8/2023)   Stress: Not on file   Social Connections: Unknown (3/19/2021)    Received from [employer]    Social Connections     Frequency of Communication with Friends and Family: Not asked     Frequency of Social Gatherings with Friends and Family: Not asked   Intimate Partner Violence: Unknown (3/19/2021)    Received from [employer]    Intimate Partner Violence     Fear of Current or Ex-Partner: Not asked     Emotionally Abused: Not asked     Physically Abused: Not asked     Sexually Abused: Not asked   Housing Stability: Not At Risk (3/9/2022)    Received from [employer]    Housing Stability     Was there a time when you did not have a steady place to sleep: Not asked     Worried that the place you are staying is making you sick: Not asked     Family History   Problem Relation Age of Onset    High Cholesterol Mother     Kidney Disease Sister     Hypertension Mother        There were no vitals taken for this visit.  Physical Exam  General   Mental Status - Patient is alert and oriented X3. Build & Nutrition - Well nourished.      Chest and Lung Exam   Chest and lung exam reveals  - normal excursion with symmetric chest walls, quiet, even and easy respiratory effort with no use of accessory muscles and on auscultation, normal breath sounds, no adventitious sounds and normal vocal resonance.      Cardiovascular   Cardiovascular examination reveals  - normal heart sounds, regular rate and rhythm with no murmurs.      Abdomen   Palpation/Percussion: Palpation and

## 2024-12-18 NOTE — TELEPHONE ENCOUNTER
----- Message from Dr. Jesus Hogue MD sent at 12/18/2024  2:02 PM EST -----  Schedule excision of vaginal mesh. Hold Plavix before cardiologist is Dr. Srinath Ralph  Did orders  Lithotomy position

## 2024-12-31 NOTE — TELEPHONE ENCOUNTER
Per cardiology, patient needs to be seen for cardiac clearance.  Current appointment is 2/20/25 with Dr. Ralph.

## 2025-01-03 ENCOUNTER — TELEPHONE (OUTPATIENT)
Dept: UROLOGY | Age: 65
End: 2025-01-03

## 2025-01-03 NOTE — TELEPHONE ENCOUNTER
The patient called in to let Mykel know her appt with Dr. Ralph has been moved up to 1/7/25 @ 1pm.

## 2025-01-07 ENCOUNTER — TELEPHONE (OUTPATIENT)
Dept: UROLOGY | Age: 65
End: 2025-01-07

## 2025-01-07 PROBLEM — T83.721A EXPOSURE OF VAGINAL MESH THROUGH VAGINAL WALL (HCC): Status: ACTIVE | Noted: 2024-12-18

## 2025-01-07 NOTE — TELEPHONE ENCOUNTER
Procedures: Procedure(s):   EXCISION OF VAGINAL MESH - LITHOTOMY POSITION   Date: 1/30/2025   Time: 1114   Location: [facility] MAIN OR 08     Scheduled.

## 2025-01-14 ENCOUNTER — TELEPHONE (OUTPATIENT)
Dept: UROLOGY | Age: 65
End: 2025-01-14

## 2025-01-14 NOTE — TELEPHONE ENCOUNTER
The patient is wanting to know how soon could she possibly reschedule her appt? The patient is going to see her PCP tomorrow because she believes she has the flu.

## 2025-01-23 ENCOUNTER — HOSPITAL ENCOUNTER (OUTPATIENT)
Dept: SURGERY | Age: 65
Discharge: HOME | End: 2025-01-26
Payer: MEDICAID

## 2025-01-23 ENCOUNTER — TELEPHONE (OUTPATIENT)
Dept: SURGERY | Age: 65
End: 2025-01-23

## 2025-01-23 VITALS
OXYGEN SATURATION: 90 % | RESPIRATION RATE: 20 BRPM | DIASTOLIC BLOOD PRESSURE: 90 MMHG | SYSTOLIC BLOOD PRESSURE: 169 MMHG | TEMPERATURE: 98 F | HEIGHT: 67 IN | BODY MASS INDEX: 28.88 KG/M2 | HEART RATE: 100 BPM | WEIGHT: 184 LBS

## 2025-01-23 DIAGNOSIS — T83.721A EXPOSURE OF IMPLANTED VAGINAL MESH, INITIAL ENCOUNTER (HCC): ICD-10-CM

## 2025-01-23 LAB
APPEARANCE UR: CLEAR
BACTERIA URNS QL MICRO: ABNORMAL /HPF
BASOPHILS # BLD: 0.08 K/UL (ref 0–0.2)
BASOPHILS NFR BLD: 0.8 % (ref 0–2)
BILIRUB UR QL: NEGATIVE
COLOR UR: ABNORMAL
DIFFERENTIAL METHOD BLD: NORMAL
EOSINOPHIL # BLD: 0.72 K/UL (ref 0–0.8)
EOSINOPHIL NFR BLD: 7.3 % (ref 0.5–7.8)
EPI CELLS #/AREA URNS HPF: ABNORMAL /HPF
ERYTHROCYTE [DISTWIDTH] IN BLOOD BY AUTOMATED COUNT: 12.9 % (ref 11.9–14.6)
GLUCOSE UR STRIP.AUTO-MCNC: NEGATIVE MG/DL
HCT VFR BLD AUTO: 45.6 % (ref 35.8–46.3)
HGB BLD-MCNC: 14.4 G/DL (ref 11.7–15.4)
HGB UR QL STRIP: ABNORMAL
IMM GRANULOCYTES # BLD AUTO: 0.03 K/UL (ref 0–0.5)
IMM GRANULOCYTES NFR BLD AUTO: 0.3 % (ref 0–5)
KETONES UR QL STRIP.AUTO: NEGATIVE MG/DL
LEUKOCYTE ESTERASE UR QL STRIP.AUTO: ABNORMAL
LYMPHOCYTES # BLD: 4 K/UL (ref 0.5–4.6)
LYMPHOCYTES NFR BLD: 40.4 % (ref 13–44)
MCH RBC QN AUTO: 29.1 PG (ref 26.1–32.9)
MCHC RBC AUTO-ENTMCNC: 31.6 G/DL (ref 31.4–35)
MCV RBC AUTO: 92.1 FL (ref 82–102)
MONOCYTES # BLD: 0.77 K/UL (ref 0.1–1.3)
MONOCYTES NFR BLD: 7.8 % (ref 4–12)
NEUTS SEG # BLD: 4.31 K/UL (ref 1.7–8.2)
NEUTS SEG NFR BLD: 43.4 % (ref 43–78)
NITRITE UR QL STRIP.AUTO: NEGATIVE
NRBC # BLD: 0 K/UL (ref 0–0.2)
PH UR STRIP: 6.5 (ref 5–9)
PLATELET # BLD AUTO: 177 K/UL (ref 150–450)
PMV BLD AUTO: 10.6 FL (ref 9.4–12.3)
PROT UR STRIP-MCNC: NEGATIVE MG/DL
RBC # BLD AUTO: 4.95 M/UL (ref 4.05–5.2)
RBC #/AREA URNS HPF: ABNORMAL /HPF
SP GR UR REFRACTOMETRY: <1.005 (ref 1–1.02)
UROBILINOGEN UR QL STRIP.AUTO: 0.2 EU/DL (ref 0.2–1)
WBC # BLD AUTO: 9.9 K/UL (ref 4.3–11.1)
WBC URNS QL MICRO: ABNORMAL /HPF

## 2025-01-23 PROCEDURE — 81001 URINALYSIS AUTO W/SCOPE: CPT

## 2025-01-23 PROCEDURE — 87086 URINE CULTURE/COLONY COUNT: CPT

## 2025-01-23 PROCEDURE — 87088 URINE BACTERIA CULTURE: CPT

## 2025-01-23 PROCEDURE — 85025 COMPLETE CBC W/AUTO DIFF WBC: CPT

## 2025-01-23 PROCEDURE — 87186 SC STD MICRODIL/AGAR DIL: CPT

## 2025-01-23 NOTE — PROGRESS NOTES
Chart reviewed by Dr Hawkins. Pt needs to postpone surgery until 2 weeks after  symptoms have resolved.Case message sent to Ellen Canas at Dr Hogue's office with Dr Hawkins's response.

## 2025-01-23 NOTE — PROGRESS NOTES
Patient verified name and     Order for consent was  found in EHR and does match case posting; patient verified.     Type 1B surgery, walk in assessment complete.    Labs per surgeon: cbc,ua,ua cx; results pending  Labs per anesthesia protocol: poc glucose ;   EK25 in Care Everywhere . Chart sent to Anesthesia for review   On  Pt called office with C/o Flu . Saw [facility] PCP neg FLU , COVID. Here today sats upper 80s low 90s after walking. Hx COPD . Productice cough , wheezes and crackles. Cardiac Clearance in chart.    Plavix hold from Dr Ralph in Care everywhere         Patient provided with and instructed on educational handouts including Guide to Surgery, Preventing Surgical Site Infections, Pain Management, and [facility] Anesthesia Brochure.    Patient answered medical/surgical history questions at their best of ability. All prior to admission medications documented in EPIC. Original medication prescription bottle was not visualized during patient appointment.     Patient instructed to hold all vitamins 7 days prior to surgery and NSAIDS 5 days prior to surgery, patient verbalized understanding.     Patient teach back successful and patient demonstrates knowledge of instructions.

## 2025-01-23 NOTE — TELEPHONE ENCOUNTER
Latest Reference Range & Units 01/23/25 12:04   WBC, UA 0 /hpf 10-20   RBC, UA 0 /hpf 0-3   Epithelial Cells, UA 0 /hpf 0-3   Bacteria, UA 0 /hpf 4+ (H)   Leukocyte Esterase, Urine NEG   SMALL !   (H): Data is abnormally high  !: Data is abnormal  Please review labs

## 2025-01-23 NOTE — PROGRESS NOTES
PLEASE CONTINUE TAKING ALL PRESCRIPTION MEDICATIONS UP TO THE DAY OF SURGERY UNLESS OTHERWISE DIRECTED BELOW. You may take Tylenol, allergy,  and/or indigestion medications.     TAKE ONLY THESE MEDICATIONS ON THE DAY OF SURGERY    Albuterol, Asprin 81 mg, Trelegy, Levothyroxin, Metoprolol, Oxybutynin, Pantoprazole, Ventolin            DISCONTINUE all vitamins and supplements 7 days prior to surgery. DISCONTINUE Non-Steroidal Anti-Inflammatory (NSAIDS) such as Advil and Aleve 5 days prior to surgery.     Home Medications to Hold- please continue all other medications except these.    Plavix , last dose 1/24/25         Comments      On the day before surgery please take 2 Tylenol in the morning and then again before bed. You may use either regular or extra strength. 1/29/25            Please do not bring home medications with you on the day of surgery unless otherwise directed by your nurse.  If you are instructed to bring home medications, please give them to your nurse as they will be administered by the nursing staff.    If you have any questions, please call [facility] [phone].    A copy of this note was provided to the patient for reference.

## 2025-01-24 ENCOUNTER — TELEPHONE (OUTPATIENT)
Dept: PULMONOLOGY | Age: 65
End: 2025-01-24

## 2025-01-24 NOTE — TELEPHONE ENCOUNTER
Patient says that her phone was telling her that her wifi had failed . She could not get on . Ask for a call to speak with Irasema Goldman

## 2025-01-26 LAB
BACTERIA SPEC CULT: ABNORMAL
SERVICE CMNT-IMP: ABNORMAL

## 2025-01-27 NOTE — TELEPHONE ENCOUNTER
will call patient to reschedule appointment with patient a hour  pallative care appointment for DUNIA GUTIERREZ. Krystyna harris

## 2025-01-28 ENCOUNTER — TELEPHONE (OUTPATIENT)
Dept: UROLOGY | Age: 65
End: 2025-01-28

## 2025-01-31 ENCOUNTER — HOSPITAL ENCOUNTER (EMERGENCY)
Age: 65
Discharge: HOME | End: 2025-01-31
Attending: EMERGENCY MEDICINE
Payer: MEDICAID

## 2025-01-31 ENCOUNTER — APPOINTMENT (OUTPATIENT)
Dept: GENERAL RADIOLOGY | Age: 65
End: 2025-01-31
Payer: MEDICAID

## 2025-01-31 VITALS
TEMPERATURE: 98 F | RESPIRATION RATE: 19 BRPM | HEIGHT: 60 IN | SYSTOLIC BLOOD PRESSURE: 146 MMHG | OXYGEN SATURATION: 95 % | WEIGHT: 167 LBS | DIASTOLIC BLOOD PRESSURE: 75 MMHG | HEART RATE: 95 BPM | BODY MASS INDEX: 32.79 KG/M2

## 2025-01-31 DIAGNOSIS — J44.1 COPD EXACERBATION (HCC): Primary | ICD-10-CM

## 2025-01-31 LAB
ALBUMIN SERPL-MCNC: 3.9 G/DL (ref 3.2–4.6)
ALBUMIN/GLOB SERPL: 1.2 (ref 1–1.9)
ALP SERPL-CCNC: 56 U/L (ref 35–104)
ALT SERPL-CCNC: 10 U/L (ref 12–65)
ANION GAP SERPL CALC-SCNC: 9 MMOL/L (ref 7–16)
AST SERPL-CCNC: 23 U/L (ref 15–37)
BASOPHILS # BLD: 0.06 K/UL (ref 0–0.2)
BASOPHILS NFR BLD: 0.8 % (ref 0–2)
BILIRUB SERPL-MCNC: 0.6 MG/DL (ref 0–1.2)
BUN SERPL-MCNC: 14 MG/DL (ref 8–23)
CALCIUM SERPL-MCNC: 9.9 MG/DL (ref 8.8–10.2)
CHLORIDE SERPL-SCNC: 102 MMOL/L (ref 98–107)
CO2 SERPL-SCNC: 33 MMOL/L (ref 20–29)
CREAT SERPL-MCNC: 0.89 MG/DL (ref 0.8–1.3)
DIFFERENTIAL METHOD BLD: ABNORMAL
EOSINOPHIL # BLD: 0.14 K/UL (ref 0–0.8)
EOSINOPHIL NFR BLD: 1.9 % (ref 0.5–7.8)
ERYTHROCYTE [DISTWIDTH] IN BLOOD BY AUTOMATED COUNT: 12.5 % (ref 11.9–14.6)
GLOBULIN SER CALC-MCNC: 3.3 G/DL (ref 2.3–3.5)
GLUCOSE SERPL-MCNC: 137 MG/DL (ref 65–100)
HCT VFR BLD AUTO: 47.3 % (ref 35.8–46.3)
HGB BLD-MCNC: 15.4 G/DL (ref 11.7–15.4)
IMM GRANULOCYTES # BLD AUTO: 0.01 K/UL (ref 0–0.5)
IMM GRANULOCYTES NFR BLD AUTO: 0.1 % (ref 0–5)
LYMPHOCYTES # BLD: 2.25 K/UL (ref 0.5–4.6)
LYMPHOCYTES NFR BLD: 30.7 % (ref 13–44)
MCH RBC QN AUTO: 29.3 PG (ref 26.1–32.9)
MCHC RBC AUTO-ENTMCNC: 32.6 G/DL (ref 31.4–35)
MCV RBC AUTO: 89.9 FL (ref 82–102)
MONOCYTES # BLD: 0.38 K/UL (ref 0.1–1.3)
MONOCYTES NFR BLD: 5.2 % (ref 4–12)
NEUTS SEG # BLD: 4.49 K/UL (ref 1.7–8.2)
NEUTS SEG NFR BLD: 61.3 % (ref 43–78)
NRBC # BLD: 0 K/UL (ref 0–0.2)
NT PRO BNP: 173 PG/ML (ref 0–450)
PLATELET # BLD AUTO: 169 K/UL (ref 150–450)
PMV BLD AUTO: 10.6 FL (ref 9.4–12.3)
POTASSIUM SERPL-SCNC: 4.6 MMOL/L (ref 3.5–5.1)
PROCALCITONIN SERPL-MCNC: 0.03 NG/ML (ref 0–0.49)
PROT SERPL-MCNC: 7.2 G/DL (ref 6.3–8.2)
RBC # BLD AUTO: 5.26 M/UL (ref 4.05–5.2)
SODIUM SERPL-SCNC: 144 MMOL/L (ref 133–143)
WBC # BLD AUTO: 7.3 K/UL (ref 4.3–11.1)

## 2025-01-31 PROCEDURE — 83880 ASSAY OF NATRIURETIC PEPTIDE: CPT

## 2025-01-31 PROCEDURE — 6360000002 HC RX W HCPCS: Performed by: EMERGENCY MEDICINE

## 2025-01-31 PROCEDURE — 96374 THER/PROPH/DIAG INJ IV PUSH: CPT

## 2025-01-31 PROCEDURE — 84145 PROCALCITONIN (PCT): CPT

## 2025-01-31 PROCEDURE — 2500000003 HC RX 250 WO HCPCS: Performed by: EMERGENCY MEDICINE

## 2025-01-31 PROCEDURE — 80053 COMPREHEN METABOLIC PANEL: CPT

## 2025-01-31 PROCEDURE — 85025 COMPLETE CBC W/AUTO DIFF WBC: CPT

## 2025-01-31 PROCEDURE — 71045 X-RAY EXAM CHEST 1 VIEW: CPT

## 2025-01-31 PROCEDURE — 99285 EMERGENCY DEPT VISIT HI MDM: CPT

## 2025-01-31 RX ORDER — ALBUTEROL SULFATE 5 MG/ML
5 SOLUTION RESPIRATORY (INHALATION)
Status: COMPLETED | OUTPATIENT
Start: 2025-01-31 | End: 2025-01-31

## 2025-01-31 RX ORDER — PREDNISONE 20 MG/1
40 TABLET ORAL DAILY
Qty: 10 TABLET | Refills: 0 | Status: SHIPPED | OUTPATIENT
Start: 2025-01-31 | End: 2025-02-05

## 2025-01-31 RX ADMIN — WATER 125 MG: 1 INJECTION INTRAMUSCULAR; INTRAVENOUS; SUBCUTANEOUS at 12:26

## 2025-01-31 RX ADMIN — ALBUTEROL SULFATE 5 MG: 2.5 SOLUTION RESPIRATORY (INHALATION) at 14:04

## 2025-01-31 ASSESSMENT — PAIN SCALES - GENERAL: PAINLEVEL_OUTOF10: 6

## 2025-01-31 ASSESSMENT — ENCOUNTER SYMPTOMS
SINUS CONGESTION: 0
ABDOMINAL PAIN: 0
COUGH: 1
CHEST TIGHTNESS: 0
SHORTNESS OF BREATH: 1
ABDOMINAL DISTENTION: 0
EYE REDNESS: 0
EYE PAIN: 0
WHEEZING: 0
VOICE CHANGE: 0
BACK PAIN: 0

## 2025-01-31 ASSESSMENT — LIFESTYLE VARIABLES
HOW OFTEN DO YOU HAVE A DRINK CONTAINING ALCOHOL: NEVER
HOW MANY STANDARD DRINKS CONTAINING ALCOHOL DO YOU HAVE ON A TYPICAL DAY: PATIENT DOES NOT DRINK

## 2025-01-31 ASSESSMENT — PAIN - FUNCTIONAL ASSESSMENT: PAIN_FUNCTIONAL_ASSESSMENT: 0-10

## 2025-01-31 NOTE — ED NOTES
I have reviewed discharge instructions with the patient.  The patient verbalized understanding.    Patient left ED via Discharge Method: wheelchair to Home with family.    Opportunity for questions and clarification provided.       Patient given 1 scripts.         To continue your aftercare when you leave the hospital, you may receive an automated call from our care team to check in on how you are doing.  This is a free service and part of our promise to provide the best care and service to meet your aftercare needs.” If you have questions, or wish to unsubscribe from this service please call [phone].  Thank you for Choosing our [facility] Emergency Department.

## 2025-01-31 NOTE — DISCHARGE INSTRUCTIONS
Take steroids as prescribed  Follow-up with your primary care provider  Return to the ER for any new, worsening or life-threatening symptom

## 2025-01-31 NOTE — ED PROVIDER NOTES
Emergency Department Provider Note       PCP: Amparo Gil MD   Age: 64 y.o.   Sex: female     DISPOSITION Decision To Discharge 01/31/2025 02:25:04 PM    ICD-10-CM    1. COPD exacerbation (HCC)  J44.1           Medical Decision Making     Wheezing here will continue to monitor symptoms.  Obtain chest x-ray and basic labs    2:06 PM EST  Chest x-ray is clear.  Still with some wheezing but sats are stable on her normal 3 L.  Will repeat nebs here.  She has been given Solu-Medrol as well.    2:26 PM EST  Wheezing is improved.  Appears stable for discharge and outpatient follow-up with PCP.       1 or more chronic illnesses with a severe exacerbation or progression.  Over the counter drug management performed.  Prescription drug management performed.  Chronic medical problems impacting care include COPD.  Shared medical decision making was utilized in creating the patients health plan today.  I independently ordered and reviewed each unique test.    I reviewed external records: ED visit note from a different ED.   I reviewed external records: provider visit note from PCP.  I reviewed external records: provider visit note from outside specialist.  I reviewed external records: previous EKG including cardiologist interpretation.    I reviewed external records: previous lab results from outside ED.  I reviewed external records: previous imaging study including radiologist interpretation.   The patients assessment required an independent historian: EMS gives supplemental history.  The reason they were needed is important historical information not provided by the patient.    I interpreted the X-rays no pneumonia pneumothorax.              History     Patient presents the ER with complaints of cough and shortness of breath.  Patient with a history of COPD and is oxygen dependent.  Reports she has had some worsening with her breathing over the past couple days.  Went to primary care physician's office today with

## 2025-01-31 NOTE — ED TRIAGE NOTES
Pt presents to ED via [facility] from providers office. Pt was being seen for productive cough x 3 days but EMS was called due to oxygen reading of 88. Pt with duoneb from EMS still running. MD at bedside in triage. Hx COPD.

## 2025-02-05 ENCOUNTER — OFFICE VISIT (OUTPATIENT)
Dept: UROLOGY | Age: 65
End: 2025-02-05

## 2025-02-05 ENCOUNTER — TELEPHONE (OUTPATIENT)
Dept: UROLOGY | Age: 65
End: 2025-02-05

## 2025-02-05 DIAGNOSIS — R32 URINARY INCONTINENCE, UNSPECIFIED TYPE: Primary | ICD-10-CM

## 2025-02-05 NOTE — TELEPHONE ENCOUNTER
Pt has nonfunctioning Interstim battery, will need replacement of battery after she has had pelvic mesh removed

## 2025-02-10 ENCOUNTER — ANESTHESIA EVENT (OUTPATIENT)
Dept: SURGERY | Age: 65
End: 2025-02-10
Payer: MEDICAID

## 2025-02-10 ENCOUNTER — TELEPHONE (OUTPATIENT)
Dept: UROLOGY | Age: 65
End: 2025-02-10

## 2025-02-10 RX ORDER — SODIUM CHLORIDE 9 MG/ML
INJECTION, SOLUTION INTRAVENOUS PRN
Status: CANCELLED | OUTPATIENT
Start: 2025-02-10

## 2025-02-10 NOTE — TELEPHONE ENCOUNTER
The patient's chart shows she's scheduled for a surgery tomorrow but she states the main hospital told her she doesn't. Is this patient's surgery still a go for tomorrow?

## 2025-02-10 NOTE — PERIOP NOTE
Preop department called to notify patient of arrival time for scheduled procedure. Instructions given to   - Arrive at [facility] Entrance [address].  - No solid food after midnight & Please drink 32 ounces of water 2 hours prior to your arrival to avoid dehydration unless otherwise indicated. No gum, mints, or ice chips.   - Have a responsible adult to drive patient to the hospital, stay during surgery, and patient will need supervision 24 hours after anesthesia.   - Use antibacterial soap in shower the night before surgery and on the morning of surgery.       Was patient contacted: yes, has questions about payment, transferred to registration  Voicemail left:   Numbers contacted: [phone]   Arrival time: 1300  Time to complete 32 ounces of water: 1100

## 2025-02-11 ENCOUNTER — HOSPITAL ENCOUNTER (OUTPATIENT)
Age: 65
Setting detail: OUTPATIENT SURGERY
Discharge: HOME | End: 2025-02-11
Attending: UROLOGY | Admitting: UROLOGY
Payer: MEDICAID

## 2025-02-11 ENCOUNTER — ANESTHESIA (OUTPATIENT)
Dept: SURGERY | Age: 65
End: 2025-02-11
Payer: MEDICAID

## 2025-02-11 VITALS
WEIGHT: 167 LBS | DIASTOLIC BLOOD PRESSURE: 76 MMHG | RESPIRATION RATE: 26 BRPM | SYSTOLIC BLOOD PRESSURE: 138 MMHG | TEMPERATURE: 97.5 F | BODY MASS INDEX: 32.61 KG/M2 | OXYGEN SATURATION: 92 % | HEART RATE: 77 BPM

## 2025-02-11 DIAGNOSIS — T83.721S EXPOSURE OF VAGINAL MESH THROUGH VAGINAL WALL, SEQUELA: Primary | ICD-10-CM

## 2025-02-11 LAB
GLUCOSE BLD STRIP.AUTO-MCNC: 105 MG/DL (ref 65–100)
SERVICE CMNT-IMP: ABNORMAL

## 2025-02-11 PROCEDURE — 2709999900 HC NON-CHARGEABLE SUPPLY: Performed by: UROLOGY

## 2025-02-11 PROCEDURE — 7100000010 HC PHASE II RECOVERY - FIRST 15 MIN: Performed by: UROLOGY

## 2025-02-11 PROCEDURE — 88300 SURGICAL PATH GROSS: CPT

## 2025-02-11 PROCEDURE — 6360000002 HC RX W HCPCS: Performed by: UROLOGY

## 2025-02-11 PROCEDURE — 7100000001 HC PACU RECOVERY - ADDTL 15 MIN: Performed by: UROLOGY

## 2025-02-11 PROCEDURE — 3600000004 HC SURGERY LEVEL 4 BASE: Performed by: UROLOGY

## 2025-02-11 PROCEDURE — 57295 REVISE VAG GRAFT VIA VAGINA: CPT | Performed by: UROLOGY

## 2025-02-11 PROCEDURE — 7100000000 HC PACU RECOVERY - FIRST 15 MIN: Performed by: UROLOGY

## 2025-02-11 PROCEDURE — 6360000002 HC RX W HCPCS: Performed by: REGISTERED NURSE

## 2025-02-11 PROCEDURE — 82962 GLUCOSE BLOOD TEST: CPT

## 2025-02-11 PROCEDURE — 3600000014 HC SURGERY LEVEL 4 ADDTL 15MIN: Performed by: UROLOGY

## 2025-02-11 PROCEDURE — 2580000003 HC RX 258: Performed by: SURGERY

## 2025-02-11 PROCEDURE — 3700000000 HC ANESTHESIA ATTENDED CARE: Performed by: UROLOGY

## 2025-02-11 PROCEDURE — 2500000003 HC RX 250 WO HCPCS: Performed by: NURSE ANESTHETIST, CERTIFIED REGISTERED

## 2025-02-11 PROCEDURE — 3700000001 HC ADD 15 MINUTES (ANESTHESIA): Performed by: UROLOGY

## 2025-02-11 PROCEDURE — 6370000000 HC RX 637 (ALT 250 FOR IP): Performed by: SURGERY

## 2025-02-11 RX ORDER — NOREPINEPHRINE BITARTRATE 0.02 MG/ML
INJECTION, SOLUTION INTRAVENOUS
Status: DISCONTINUED | OUTPATIENT
Start: 2025-02-11 | End: 2025-02-11

## 2025-02-11 RX ORDER — NOREPINEPHRINE BITARTRATE 1 MG/ML
INJECTION, SOLUTION INTRAVENOUS
Status: DISCONTINUED | OUTPATIENT
Start: 2025-02-11 | End: 2025-02-11 | Stop reason: SDUPTHER

## 2025-02-11 RX ORDER — SODIUM CHLORIDE, SODIUM LACTATE, POTASSIUM CHLORIDE, CALCIUM CHLORIDE 600; 310; 30; 20 MG/100ML; MG/100ML; MG/100ML; MG/100ML
INJECTION, SOLUTION INTRAVENOUS CONTINUOUS
Status: DISCONTINUED | OUTPATIENT
Start: 2025-02-11 | End: 2025-02-11 | Stop reason: HOSPADM

## 2025-02-11 RX ORDER — PROPOFOL 10 MG/ML
INJECTION, EMULSION INTRAVENOUS
Status: DISCONTINUED | OUTPATIENT
Start: 2025-02-11 | End: 2025-02-11 | Stop reason: SDUPTHER

## 2025-02-11 RX ORDER — GENTAMICIN SULFATE 60 MG/50ML
120 INJECTION, SOLUTION INTRAVENOUS ONCE
Status: COMPLETED | OUTPATIENT
Start: 2025-02-11 | End: 2025-02-11

## 2025-02-11 RX ORDER — PROCHLORPERAZINE EDISYLATE 5 MG/ML
5 INJECTION INTRAMUSCULAR; INTRAVENOUS
Status: DISCONTINUED | OUTPATIENT
Start: 2025-02-11 | End: 2025-02-11 | Stop reason: HOSPADM

## 2025-02-11 RX ORDER — OXYCODONE HYDROCHLORIDE 5 MG/1
5 TABLET ORAL
Status: COMPLETED | OUTPATIENT
Start: 2025-02-11 | End: 2025-02-11

## 2025-02-11 RX ORDER — LIDOCAINE HYDROCHLORIDE 20 MG/ML
INJECTION, SOLUTION EPIDURAL; INFILTRATION; INTRACAUDAL; PERINEURAL
Status: DISCONTINUED | OUTPATIENT
Start: 2025-02-11 | End: 2025-02-11 | Stop reason: SDUPTHER

## 2025-02-11 RX ORDER — LIDOCAINE HYDROCHLORIDE 10 MG/ML
1 INJECTION, SOLUTION INFILTRATION; PERINEURAL
Status: DISCONTINUED | OUTPATIENT
Start: 2025-02-11 | End: 2025-02-11 | Stop reason: HOSPADM

## 2025-02-11 RX ORDER — HYDROCODONE BITARTRATE AND ACETAMINOPHEN 5; 325 MG/1; MG/1
1 TABLET ORAL EVERY 6 HOURS PRN
Qty: 12 TABLET | Refills: 0 | Status: SHIPPED | OUTPATIENT
Start: 2025-02-11 | End: 2025-02-14

## 2025-02-11 RX ORDER — HALOPERIDOL 5 MG/ML
1 INJECTION INTRAMUSCULAR
Status: DISCONTINUED | OUTPATIENT
Start: 2025-02-11 | End: 2025-02-11 | Stop reason: HOSPADM

## 2025-02-11 RX ORDER — IPRATROPIUM BROMIDE AND ALBUTEROL SULFATE 2.5; .5 MG/3ML; MG/3ML
1 SOLUTION RESPIRATORY (INHALATION)
Status: DISCONTINUED | OUTPATIENT
Start: 2025-02-11 | End: 2025-02-11 | Stop reason: HOSPADM

## 2025-02-11 RX ORDER — SODIUM CHLORIDE 0.9 % (FLUSH) 0.9 %
5-40 SYRINGE (ML) INJECTION PRN
Status: DISCONTINUED | OUTPATIENT
Start: 2025-02-11 | End: 2025-02-11 | Stop reason: HOSPADM

## 2025-02-11 RX ORDER — NALOXONE HYDROCHLORIDE 0.4 MG/ML
INJECTION, SOLUTION INTRAMUSCULAR; INTRAVENOUS; SUBCUTANEOUS PRN
Status: DISCONTINUED | OUTPATIENT
Start: 2025-02-11 | End: 2025-02-11 | Stop reason: HOSPADM

## 2025-02-11 RX ORDER — CEFAZOLIN SODIUM 1 G/3ML
INJECTION, POWDER, FOR SOLUTION INTRAMUSCULAR; INTRAVENOUS PRN
Status: DISCONTINUED | OUTPATIENT
Start: 2025-02-11 | End: 2025-02-11 | Stop reason: ALTCHOICE

## 2025-02-11 RX ORDER — SODIUM CHLORIDE 0.9 % (FLUSH) 0.9 %
5-40 SYRINGE (ML) INJECTION EVERY 12 HOURS SCHEDULED
Status: DISCONTINUED | OUTPATIENT
Start: 2025-02-11 | End: 2025-02-11 | Stop reason: HOSPADM

## 2025-02-11 RX ORDER — LABETALOL HYDROCHLORIDE 5 MG/ML
10 INJECTION, SOLUTION INTRAVENOUS
Status: DISCONTINUED | OUTPATIENT
Start: 2025-02-11 | End: 2025-02-11 | Stop reason: HOSPADM

## 2025-02-11 RX ADMIN — NOREPINEPHRINE BITARTRATE 8 MCG: 1 SOLUTION INTRAVENOUS at 16:54

## 2025-02-11 RX ADMIN — PROPOFOL 200 MG: 10 INJECTION, EMULSION INTRAVENOUS at 16:24

## 2025-02-11 RX ADMIN — NOREPINEPHRINE BITARTRATE 8 MCG: 1 SOLUTION INTRAVENOUS at 16:28

## 2025-02-11 RX ADMIN — NOREPINEPHRINE BITARTRATE 8 MCG: 1 SOLUTION INTRAVENOUS at 16:48

## 2025-02-11 RX ADMIN — GENTAMICIN SULFATE 120 MG: 60 INJECTION, SOLUTION INTRAVENOUS at 16:22

## 2025-02-11 RX ADMIN — LIDOCAINE HYDROCHLORIDE 100 MG: 20 INJECTION, SOLUTION EPIDURAL; INFILTRATION; INTRACAUDAL; PERINEURAL at 16:24

## 2025-02-11 RX ADMIN — SODIUM CHLORIDE, SODIUM LACTATE, POTASSIUM CHLORIDE, AND CALCIUM CHLORIDE: 600; 310; 30; 20 INJECTION, SOLUTION INTRAVENOUS at 16:17

## 2025-02-11 RX ADMIN — OXYCODONE HYDROCHLORIDE 5 MG: 5 TABLET ORAL at 17:11

## 2025-02-11 ASSESSMENT — PAIN SCALES - GENERAL: PAINLEVEL_OUTOF10: 4

## 2025-02-11 ASSESSMENT — COPD QUESTIONNAIRES: CAT_SEVERITY: SEVERE

## 2025-02-11 ASSESSMENT — PAIN DESCRIPTION - LOCATION: LOCATION: VAGINA

## 2025-02-11 ASSESSMENT — PAIN DESCRIPTION - DESCRIPTORS: DESCRIPTORS: ACHING

## 2025-02-11 NOTE — H&P
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  Has one cat and one dog.  Single.  Has lived in [place] and [place].      Social Determinants of Health           Financial Resource Strain: Not on file   Food Insecurity: Not on file   Transportation Needs: Not on file   Physical Activity: Not on file (9/8/2023)   Stress: Not on file   Social Connections: Unknown (3/19/2021)     Received from [facility]     Social Connections      Frequency of Communication with Friends and Family: Not asked      Frequency of Social Gatherings with Friends and Family: Not asked   Intimate Partner Violence: Unknown (3/19/2021)     Received from [facility]     Intimate Partner Violence      Fear of Current or Ex-Partner: Not asked      Emotionally Abused: Not asked      Physically Abused: Not asked      Sexually Abused: Not asked   Housing Stability: Not At Risk (3/9/2022)     Received from [facility]     Housing Stability      Was there a time when you did not have a steady place to sleep: Not asked      Worried that the place you are staying is making you sick: Not asked         Family History         Family History   Problem Relation Age of Onset    High Cholesterol Mother      Kidney Disease Sister      Hypertension Mother              There were no vitals taken for this visit.  Physical Exam  General   Mental Status - Patient is alert and oriented X3. Build & Nutrition - Well nourished.        Chest and Lung Exam   Chest and lung exam reveals  - normal excursion with symmetric chest walls, quiet, even and easy respiratory effort with no use of accessory muscles and on auscultation, normal breath sounds, no adventitious sounds and normal vocal

## 2025-02-11 NOTE — DISCHARGE INSTRUCTIONS
MEDICATION INTERACTION:    During your procedure you potentially received a medication or medications which may reduce the effectiveness of oral contraceptives. Please consider other forms of contraception for 1 month following your procedure if you are currently using oral contraceptives as your primary form of birth control. In addition to this, we recommend continuing your oral contraceptive as prescribed, unless otherwise instructed by your physician, during this time.    ACTIVITY  As tolerated and as directed by your doctor.   You may shower in 24 hours. Do not take a bath until cleared by MD.   Change peripad as needed , call if you saturate peripad x 2 with blood    DIET  Clear liquids until no nausea or vomiting, then light diet for the first day.  Advance to regular diet on second day, unless your doctor orders otherwise.   If nausea and vomiting continues, call your doctor.     PAIN  Take pain medication as directed by your doctor.   Call your doctor if pain is NOT relieved by medication.      CALL YOUR DOCTOR IF   Excessive bleeding that does not stop after holding pressure over the area.  Temperature of 101 degrees F or above.  Excessive redness, swelling or bruising, and/or green or yellow, smelly discharge from incision.    After general anesthesia or intravenous sedation, for 24 hours or while taking prescription Narcotics:  Limit your activities  Some people will feel drowsy or dizzy for up to a few hours after waking up.  A responsible adult needs to be with you for the next 24 hours  Do not drive and operate hazardous machinery  Do not make important personal or business decisions  Do not drink alcoholic beverages  If you have not urinated within 8 hours after discharge, and you are experiencing discomfort from urinary retention, please go to the nearest ED.  If you have sleep apnea and have a CPAP machine, please use it for all naps and sleeping.  Please use caution when taking narcotics and any

## 2025-02-11 NOTE — ANESTHESIA PRE PROCEDURE
Procedure Laterality Date   • CORONARY STENT PLACEMENT      x2   • HYSTERECTOMY, TOTAL ABDOMINAL (CERVIX REMOVED)     • ROTATOR CUFF REPAIR         Social History:    Social History     Tobacco Use   • Smoking status: Former     Current packs/day: 0.00     Average packs/day: 0.3 packs/day for 41.0 years (10.3 ttl pk-yrs)     Types: Cigarettes     Start date:      Quit date:      Years since quittin.1   • Smokeless tobacco: Never   Substance Use Topics   • Alcohol use: No                                Counseling given: Not Answered      Vital Signs (Current):   Vitals:    25 1200   BP: (!) 188/99   Pulse: 94   Resp: 18   Temp: 98.2 °F (36.8 °C)   TempSrc: Oral   SpO2: 96%   Weight: 75.8 kg (167 lb)                                              BP Readings from Last 3 Encounters:   25 (!) 188/99   25 (!) 146/75   25 (!) 169/90       NPO Status: Time of last liquid consumption: 0800                        Time of last solid consumption:                         Date of last liquid consumption: 25                        Date of last solid food consumption: 02/10/25    BMI:   Wt Readings from Last 3 Encounters:   25 75.8 kg (167 lb)   25 75.8 kg (167 lb)   25 83.5 kg (184 lb)     Body mass index is 32.61 kg/m².    CBC:   Lab Results   Component Value Date/Time    WBC 7.3 2025 12:16 PM    RBC 5.26 2025 12:16 PM    HGB 15.4 2025 12:16 PM    HCT 47.3 2025 12:16 PM    MCV 89.9 2025 12:16 PM    RDW 12.5 2025 12:16 PM     2025 12:16 PM       CMP:   Lab Results   Component Value Date/Time     2025 12:16 PM    K 4.6 2025 12:16 PM     2025 12:16 PM    CO2 33 2025 12:16 PM    BUN 14 2025 12:16 PM    CREATININE 0.89 2025 12:16 PM    GFRAA 50 10/15/2019 01:57 PM    LABGLOM 71 2025 12:16 PM    GLUCOSE 137 2025 12:16 PM    CALCIUM 9.9 2025 12:16 PM

## 2025-02-11 NOTE — BRIEF OP NOTE
Brief Postoperative Note      Patient: Adriana Kulkarni  YOB: 1960  MRN: [id number]    Date of Procedure: 2/11/2025    Pre-Op Diagnosis Codes:      * Exposure of vaginal mesh through vaginal wall ([facility]) [T83.721A]    Post-Op Diagnosis: Same       Procedure(s):  EXCISION OF VAGINAL MESH - LITHOTOMY POSITION    Surgeon(s):  Donald Hogue Jr., MD    Assistant:  * No surgical staff found *    Anesthesia: General    Estimated Blood Loss (mL): Minimal    Complications: None    Specimens:   ID Type Source Tests Collected by Time Destination   A : vaginal mesh Tissue Vaginal SURGICAL PATHOLOGY Donald Hogue Jr., MD 2/11/2025 1648        Implants:  * No implants in log *      Drains: * No LDAs found *    Findings:  Infection Present At Time Of Surgery (PATOS) (choose all levels that have infection present):  No infection present  Other Findings: see op note    Electronically signed by DONALD HOGUE JR, MD on 2/11/2025 at 5:02 PM

## 2025-02-11 NOTE — ANESTHESIA POSTPROCEDURE EVALUATION
Department of Anesthesiology  Postprocedure Note    Patient: Adriana Kulkarni  MRN: [id number]  YOB: 1960  Date of evaluation: 2/11/2025    Procedure Summary       Date: 02/11/25 Room / Location: [facility] OP OR 02 / [facility]    Anesthesia Start: 1617 Anesthesia Stop: 1709    Procedure: EXCISION OF VAGINAL MESH - LITHOTOMY POSITION Diagnosis:       Exposure of vaginal mesh through vaginal wall (HCC)      (Exposure of vaginal mesh through vaginal wall (HCC) [T83.721A])    Surgeons: Jesus Hogue Jr., MD Responsible Provider: Georgi Lino MD    Anesthesia Type: Not recorded ASA Status: 4            Anesthesia Type: No value filed.    Alexandra Phase I: Alexandra Score: 10    Alexandra Phase II:      Anesthesia Post Evaluation    Patient location during evaluation: PACU  Patient participation: complete - patient participated  Level of consciousness: awake and alert  Airway patency: patent  Nausea & Vomiting: no nausea and no vomiting  Cardiovascular status: hemodynamically stable  Respiratory status: acceptable, nonlabored ventilation and spontaneous ventilation  Hydration status: euvolemic  Comments: /74   Pulse 84   Temp 97.5 °F (36.4 °C)   Resp 19   Wt 75.8 kg (167 lb)   SpO2 93%   BMI 32.61 kg/m²     Multimodal analgesia pain management approach  Pain management: adequate and satisfactory to patient    No notable events documented.

## 2025-02-11 NOTE — ANESTHESIA PROCEDURE NOTES
Airway  Date/Time: 2/11/2025 4:26 PM  Urgency: elective    Airway not difficult    General Information and Staff    Patient location during procedure: OR  Resident/CRNA: Kaylynn Ross APRN - CRNA  Performed: resident/CRNA   Performed by: Kaylynn Ross APRN - CRNA  Authorized by: eGorgi Lino MD      Indications and Patient Condition  Indications for airway management: anesthesia  Spontaneous ventilation: present  Sedation level: deep  Preoxygenated: yes  Patient position: sniffing  MILS not maintained throughout  Mask difficulty assessment: not attempted    Final Airway Details  Final airway type: supraglottic airway      Successful airway: oropharyngeal  Size 4     Number of attempts at approach: 1  Ventilation between attempts: supraglottic airway  Number of other approaches attempted: 0    no

## 2025-02-13 ENCOUNTER — TELEPHONE (OUTPATIENT)
Dept: UROLOGY | Age: 65
End: 2025-02-13

## 2025-02-13 NOTE — TELEPHONE ENCOUNTER
The patient wants to know when can she start taking her Plavix again? She doesn't want to be off too long. An the patient also wants to know when her post op appt would be?

## 2025-02-27 ENCOUNTER — TELEPHONE (OUTPATIENT)
Dept: PULMONOLOGY | Age: 65
End: 2025-02-27

## 2025-02-27 NOTE — TELEPHONE ENCOUNTER
I spoke with patient to let her know that she will need to be seen and a walk test to qualify for  a POC. And we can do that at her upcoming appointment. Krystyna harris

## 2025-03-04 ENCOUNTER — OFFICE VISIT (OUTPATIENT)
Dept: UROLOGY | Age: 65
End: 2025-03-04

## 2025-03-04 DIAGNOSIS — N39.0 URINARY TRACT INFECTION WITH HEMATURIA, SITE UNSPECIFIED: ICD-10-CM

## 2025-03-04 DIAGNOSIS — T83.721A EXPOSURE OF IMPLANTED VAGINAL MESH, INITIAL ENCOUNTER: ICD-10-CM

## 2025-03-04 DIAGNOSIS — R31.9 URINARY TRACT INFECTION WITH HEMATURIA, SITE UNSPECIFIED: ICD-10-CM

## 2025-03-04 DIAGNOSIS — R32 URINARY INCONTINENCE, UNSPECIFIED TYPE: Primary | ICD-10-CM

## 2025-03-04 PROCEDURE — 99024 POSTOP FOLLOW-UP VISIT: CPT | Performed by: NURSE PRACTITIONER

## 2025-03-04 RX ORDER — ESTRADIOL 0.1 MG/G
1 CREAM VAGINAL
Qty: 42.5 G | Refills: 5 | Status: SHIPPED | OUTPATIENT
Start: 2025-03-06 | End: 2027-08-16

## 2025-03-04 ASSESSMENT — ENCOUNTER SYMPTOMS
BACK PAIN: 0
NAUSEA: 0

## 2025-03-04 NOTE — PROGRESS NOTES
Blood (UA POC) Moderate     pH, Urine, POC 5.5 4.6 - 8.0    Protein, Urine, POC Negative Negative mg/dL    Urobilinogen, POC 0.2 mg/dL <1.1 mg/dL    Nitrite, Urine, POC Negative Negative    Leukocyte Esterase, Urine, POC Small Negative         Assessment and Plan    ICD-10-CM    1. Urinary incontinence, unspecified type  R32       2. Exposure of implanted vaginal mesh, initial encounter  T83.721A       3. Urinary tract infection with hematuria, site unspecified  N39.0     R31.9         Urinary incontinence-  Unchanged.  After she has complete healing of recent surgery we can arrange battery change for InterStim.    Exposure of vaginal mesh-  Improved.  She had excision of this vaginal mesh done.  She is now 2 weeks postop.  I will send in estradiol cream and she is instructed to use 1 g twice a week.    UTI-  No urine was obtained today.  Overall she is doing well from that standpoint.    Orders Placed This Encounter    estradiol (ESTRACE VAGINAL) 0.1 MG/GM vaginal cream     Sig: Place 1 g vaginally twice a week     Dispense:  42.5 g     Refill:  5       Return in about 4 weeks (around 4/1/2025) for for recheck, follow up with Mykel.    Mar Hidalgo, SHIELA    [facility]   [address] [place] [address]  Phone: [phone]  Fax: [phone]     Dr. Varner was supervising physician today and approves plan of care.    Elements of this note have been dictated using speech recognition software.  Although reviewed, errors of speech recognition may have occurred.

## 2025-04-04 ENCOUNTER — TELEPHONE (OUTPATIENT)
Dept: PULMONOLOGY | Age: 65
End: 2025-04-04

## 2025-05-01 ENCOUNTER — OFFICE VISIT (OUTPATIENT)
Age: 65
End: 2025-05-01
Payer: MEDICARE

## 2025-05-01 ENCOUNTER — TELEPHONE (OUTPATIENT)
Dept: PULMONOLOGY | Age: 65
End: 2025-05-01

## 2025-05-01 VITALS
TEMPERATURE: 97.3 F | OXYGEN SATURATION: 96 % | BODY MASS INDEX: 29.05 KG/M2 | HEIGHT: 67 IN | RESPIRATION RATE: 22 BRPM | SYSTOLIC BLOOD PRESSURE: 114 MMHG | WEIGHT: 185.1 LBS | DIASTOLIC BLOOD PRESSURE: 68 MMHG | HEART RATE: 89 BPM

## 2025-05-01 DIAGNOSIS — F41.9 ANXIETY: ICD-10-CM

## 2025-05-01 DIAGNOSIS — G47.33 OSA (OBSTRUCTIVE SLEEP APNEA): ICD-10-CM

## 2025-05-01 DIAGNOSIS — R06.09 DOE (DYSPNEA ON EXERTION): ICD-10-CM

## 2025-05-01 DIAGNOSIS — J44.9 COPD, MODERATE (HCC): Primary | ICD-10-CM

## 2025-05-01 PROCEDURE — 1159F MED LIST DOCD IN RCRD: CPT | Performed by: NURSE PRACTITIONER

## 2025-05-01 PROCEDURE — G8417 CALC BMI ABV UP PARAM F/U: HCPCS | Performed by: NURSE PRACTITIONER

## 2025-05-01 PROCEDURE — 3017F COLORECTAL CA SCREEN DOC REV: CPT | Performed by: NURSE PRACTITIONER

## 2025-05-01 PROCEDURE — 1126F AMNT PAIN NOTED NONE PRSNT: CPT | Performed by: NURSE PRACTITIONER

## 2025-05-01 PROCEDURE — 1090F PRES/ABSN URINE INCON ASSESS: CPT | Performed by: NURSE PRACTITIONER

## 2025-05-01 PROCEDURE — 1036F TOBACCO NON-USER: CPT | Performed by: NURSE PRACTITIONER

## 2025-05-01 PROCEDURE — G2211 COMPLEX E/M VISIT ADD ON: HCPCS | Performed by: NURSE PRACTITIONER

## 2025-05-01 PROCEDURE — 3023F SPIROM DOC REV: CPT | Performed by: NURSE PRACTITIONER

## 2025-05-01 PROCEDURE — 1123F ACP DISCUSS/DSCN MKR DOCD: CPT | Performed by: NURSE PRACTITIONER

## 2025-05-01 PROCEDURE — 99215 OFFICE O/P EST HI 40 MIN: CPT | Performed by: NURSE PRACTITIONER

## 2025-05-01 PROCEDURE — G8400 PT W/DXA NO RESULTS DOC: HCPCS | Performed by: NURSE PRACTITIONER

## 2025-05-01 PROCEDURE — G8427 DOCREV CUR MEDS BY ELIG CLIN: HCPCS | Performed by: NURSE PRACTITIONER

## 2025-05-01 RX ORDER — PREDNISONE 10 MG/1
10 TABLET ORAL DAILY
Qty: 30 TABLET | Refills: 5 | Status: SHIPPED | OUTPATIENT
Start: 2025-05-01

## 2025-05-01 RX ORDER — TOLTERODINE 4 MG/1
CAPSULE, EXTENDED RELEASE ORAL
COMMUNITY

## 2025-05-01 RX ORDER — ROFLUMILAST 500 UG/1
500 TABLET ORAL DAILY
Qty: 30 TABLET | Refills: 11 | Status: SHIPPED | OUTPATIENT
Start: 2025-05-01

## 2025-05-01 RX ORDER — AZITHROMYCIN 250 MG/1
250 TABLET, FILM COATED ORAL
Qty: 15 TABLET | Refills: 11 | Status: SHIPPED | OUTPATIENT
Start: 2025-05-02

## 2025-05-01 RX ORDER — LAMOTRIGINE 25 MG/1
TABLET ORAL
COMMUNITY
Start: 2025-02-10

## 2025-05-01 RX ORDER — PROMETHAZINE HYDROCHLORIDE 25 MG/1
TABLET ORAL
COMMUNITY
Start: 2024-11-18

## 2025-05-01 RX ORDER — FLUTICASONE FUROATE, UMECLIDINIUM BROMIDE AND VILANTEROL TRIFENATATE 200; 62.5; 25 UG/1; UG/1; UG/1
1 POWDER RESPIRATORY (INHALATION) DAILY
Qty: 1 EACH | Refills: 11 | Status: SHIPPED | OUTPATIENT
Start: 2025-05-01

## 2025-05-01 RX ORDER — FLUTICASONE PROPIONATE AND SALMETEROL XINAFOATE 230; 21 UG/1; UG/1
AEROSOL, METERED RESPIRATORY (INHALATION)
COMMUNITY
End: 2025-05-01

## 2025-05-01 RX ORDER — FLUTICASONE FUROATE, UMECLIDINIUM BROMIDE AND VILANTEROL TRIFENATATE 100; 62.5; 25 UG/1; UG/1; UG/1
1 POWDER RESPIRATORY (INHALATION) DAILY
Qty: 1 EACH | Refills: 11 | Status: CANCELLED | OUTPATIENT
Start: 2025-05-01

## 2025-05-01 NOTE — TELEPHONE ENCOUNTER
Pt states that her medication is not covered. Please call her about what she can get in place of Daliresp or do PA.     All others are filled.   Pharmacy [facility].

## 2025-05-01 NOTE — PROGRESS NOTES
Name:  Adriana Kulkarni  YOB: 1960   MRN: [id number]      Office Visit: 5/1/2025        [place] [facility]     The patient (or guardian, if applicable) and other individuals in attendance with the patient were advised that Artificial Intelligence will be utilized during this visit to record, process the conversation to generate a clinical note, and support improvement of the AI technology. The patient (or guardian, if applicable) and other individuals in attendance at the appointment consented to the use of AI, including the recording.         Assessment & Plan (Medical Decision Making)      Impression: 64 y.o. female     Assessment & Plan  1. Chronic Obstructive Pulmonary Disease (COPD), moderately severe.  Her oxygen saturation levels are within the normal range, negating the need for continuous oxygen therapy. However, her lung function is approximately 54% of the expected capacity, contributing to her dyspnea. The potential benefits of hospice care were discussed, but she declined this option. The dosage of Trelegy will be escalated to 200 mcg. A regimen of low dose daily prednisone will be initiated, along with Daliresp daily, and azithromycin to be taken three times weekly.  An order will be placed with a medical supply company for a machine to monitor her oxygen levels during sleep.  She really wants a POC but I have explained to the patient that her oxygen levels are normal and does not qualify.  Recommended ABG, but patient declines.  I have check her O2 sats on 3 different O2 sat monitors and she simply does not qualify for O2 at rest or with exertion today.  - Roflumilast (DALIRESP) 500 MCG tablet; Take 1 tablet by mouth daily  Dispense: 30 tablet; Refill: 11  - predniSONE (DELTASONE) 10 MG tablet; Take 1 tablet by mouth daily  Dispense: 30 tablet; Refill: 5  - azithromycin (ZITHROMAX Z-NOLBERTO) 250 MG tablet; Take 1 tablet by mouth three times a week  Dispense: 15 tablet; Refill: 11  -

## 2025-05-01 NOTE — PATIENT INSTRUCTIONS
Stop current supply of Trelegy--change to Trelegy 200, one puff once daily    Use nebulizer with albuterol 3-4 times daily on a scheduled basis    Daliresp one tab daily    Add Prednisone 10 mg daily    Take azithromycin 250 mg one tab on Mon-Wed-Fri

## 2025-05-06 ENCOUNTER — TELEPHONE (OUTPATIENT)
Dept: PULMONOLOGY | Age: 65
End: 2025-05-06

## 2025-05-06 NOTE — TELEPHONE ENCOUNTER
I left a message for patient to return my call for the medication  Roflumilast (DALIRESP) 500 MCG tablet. It does not need a PA just checking to see if it went thur or if she called her insurance to see what they will pay for. Krystyna harris

## 2025-07-14 ENCOUNTER — TELEPHONE (OUTPATIENT)
Age: 65
End: 2025-07-14

## 2025-07-14 NOTE — TELEPHONE ENCOUNTER
Called patient and left VM letting her know it was time to schedule her next appointment with Dr. Beth in the [facility] office and to call the office when she is ready to schedule. Sending letter/mychart message      Return in about 6 months (around 11/1/2025) for Kirit or Alba/GEOVANNI--long appt, COPD.

## (undated) DEVICE — CYSTO/BLADDER IRRIGATION SET, REGULATING CLAMP

## (undated) DEVICE — Device

## (undated) DEVICE — RETRACTOR SURG W18.3XL32.5CM PLAS SELF RET W/ 2 CATH CLP

## (undated) DEVICE — BAG,DRAINAGE,4L,A/R TOWER,LL,SLIDE TAP: Brand: MEDLINE

## (undated) DEVICE — MINOR SPLIT GENERAL: Brand: MEDLINE INDUSTRIES, INC.

## (undated) DEVICE — HOOK RETRCT DIA5MM SHRP E STAY DISP FOR LONE STAR SELF RET

## (undated) DEVICE — BLADE CLIPPER GEN PURP NS

## (undated) DEVICE — KENDALL SCD EXPRESS SLEEVES, KNEE LENGTH, MEDIUM: Brand: KENDALL SCD

## (undated) DEVICE — SUTURE VICRYL + SZ 0 L27IN ABSRB UD L36MM CT-1 1/2 CIR VCPP41D

## (undated) DEVICE — CYSTO: Brand: MEDLINE INDUSTRIES, INC.

## (undated) DEVICE — TRAY PREP DRY W/ PREM GLV 2 APPL 6 SPNG 2 UNDPD 1 OVERWRAP

## (undated) DEVICE — ELECTRODE PT RET AD L9FT HI MOIST COND ADH HYDRGEL CORDED

## (undated) DEVICE — SYRINGE,EAR/ULCER, 2 OZ, STERILE: Brand: MEDLINE

## (undated) DEVICE — SUTURE CAPTURING DEVICE: Brand: CAPIO SLIM

## (undated) DEVICE — SOLUTION IRRIG 3000ML H2O STRL BAG

## (undated) DEVICE — SYRINGE MED 30ML STD CLR PLAS LUERLOCK TIP N CTRL DISP

## (undated) DEVICE — SUTURE VICRYL SZ 2-0 L27IN ABSRB UD L26MM CT-2 1/2 CIR J269H

## (undated) DEVICE — SUTURE PERMA-HAND SZ 0 L30IN NONABSORBABLE BLK L36MM CT-1 424H

## (undated) DEVICE — SOLUTION IRRIG 1000ML STRL H2O USP PLAS POUR BTL

## (undated) DEVICE — DEVICE SECUREMENT AD W/ TRICOT ANCHR PD FOR F LTX SIL CATH

## (undated) DEVICE — PACKING GZ W2INXL6FT WVN COT VAG RADPQ

## (undated) DEVICE — GLOVE SURG SZ 8 CRM LTX FREE POLYISOPRENE POLYMER BEAD ANTI

## (undated) DEVICE — LIQUIBAND RAPID ADHESIVE 36/CS 0.8ML: Brand: MEDLINE

## (undated) DEVICE — CATHETER,FOLEY,SILI-ELAST,LTX,16FR,10ML: Brand: MEDLINE

## (undated) DEVICE — INTENDED FOR TISSUE SEPARATION, AND OTHER PROCEDURES THAT REQUIRE A SHARP SURGICAL BLADE TO PUNCTURE OR CUT.: Brand: BARD-PARKER ® STAINLESS STEEL BLADES

## (undated) DEVICE — CARDINAL HEALTH FLEXIBLE LIGHT HANDLE COVER: Brand: CARDINAL HEALTH